# Patient Record
Sex: FEMALE | Race: WHITE | Employment: FULL TIME
[De-identification: names, ages, dates, MRNs, and addresses within clinical notes are randomized per-mention and may not be internally consistent; named-entity substitution may affect disease eponyms.]

---

## 2018-08-05 ENCOUNTER — HEALTH MAINTENANCE LETTER (OUTPATIENT)
Age: 38
End: 2018-08-05

## 2019-02-07 ENCOUNTER — OFFICE VISIT - RIVER FALLS (OUTPATIENT)
Dept: FAMILY MEDICINE | Facility: CLINIC | Age: 39
End: 2019-02-07

## 2019-02-07 ASSESSMENT — MIFFLIN-ST. JEOR: SCORE: 1495.26

## 2019-03-05 ENCOUNTER — AMBULATORY - RIVER FALLS (OUTPATIENT)
Dept: FAMILY MEDICINE | Facility: CLINIC | Age: 39
End: 2019-03-05

## 2019-03-06 LAB
T3FREE SERPL-MCNC: 2.7 PG/ML (ref 2.3–4.2)
T4 FREE SERPL-MCNC: 1.1 NG/DL (ref 0.8–1.8)
THYROGLOBULIN ABY - HISTORICAL: <1 IU/ML
THYROID PEROXIDASE ANTIBODIES - HISTORICAL: 47 IU/ML
TSH SERPL DL<=0.005 MIU/L-ACNC: 2.9 MIU/L
VIT B12 SERPL-MCNC: 418 PG/ML (ref 200–1100)

## 2019-03-15 ENCOUNTER — OFFICE VISIT - RIVER FALLS (OUTPATIENT)
Dept: FAMILY MEDICINE | Facility: CLINIC | Age: 39
End: 2019-03-15

## 2019-06-10 ENCOUNTER — OFFICE VISIT - RIVER FALLS (OUTPATIENT)
Dept: FAMILY MEDICINE | Facility: CLINIC | Age: 39
End: 2019-06-10

## 2019-06-10 ASSESSMENT — MIFFLIN-ST. JEOR: SCORE: 1453.53

## 2019-06-24 ENCOUNTER — COMMUNICATION - RIVER FALLS (OUTPATIENT)
Dept: FAMILY MEDICINE | Facility: CLINIC | Age: 39
End: 2019-06-24

## 2019-06-27 ENCOUNTER — COMMUNICATION - RIVER FALLS (OUTPATIENT)
Dept: FAMILY MEDICINE | Facility: CLINIC | Age: 39
End: 2019-06-27

## 2019-07-01 ENCOUNTER — OFFICE VISIT - RIVER FALLS (OUTPATIENT)
Dept: FAMILY MEDICINE | Facility: CLINIC | Age: 39
End: 2019-07-01

## 2019-07-01 ENCOUNTER — COMMUNICATION - RIVER FALLS (OUTPATIENT)
Dept: FAMILY MEDICINE | Facility: CLINIC | Age: 39
End: 2019-07-01

## 2019-07-01 LAB
HGB BLD-MCNC: 10.3 G/DL (ref 12–16)
MCV RBC AUTO: 80 FL (ref 80–100)

## 2019-07-01 ASSESSMENT — MIFFLIN-ST. JEOR: SCORE: 1449

## 2019-07-09 ENCOUNTER — COMMUNICATION - RIVER FALLS (OUTPATIENT)
Dept: FAMILY MEDICINE | Facility: CLINIC | Age: 39
End: 2019-07-09

## 2019-07-22 ENCOUNTER — AMBULATORY - RIVER FALLS (OUTPATIENT)
Dept: FAMILY MEDICINE | Facility: CLINIC | Age: 39
End: 2019-07-22

## 2019-07-22 ENCOUNTER — COMMUNICATION - RIVER FALLS (OUTPATIENT)
Dept: FAMILY MEDICINE | Facility: CLINIC | Age: 39
End: 2019-07-22

## 2019-07-22 LAB — HGB BLD-MCNC: 11.6 G/DL

## 2019-07-31 ENCOUNTER — COMMUNICATION - RIVER FALLS (OUTPATIENT)
Dept: FAMILY MEDICINE | Facility: CLINIC | Age: 39
End: 2019-07-31

## 2019-07-31 ENCOUNTER — OFFICE VISIT - RIVER FALLS (OUTPATIENT)
Dept: FAMILY MEDICINE | Facility: CLINIC | Age: 39
End: 2019-07-31

## 2019-07-31 ASSESSMENT — MIFFLIN-ST. JEOR: SCORE: 1439.92

## 2019-08-01 ENCOUNTER — OFFICE VISIT - RIVER FALLS (OUTPATIENT)
Dept: FAMILY MEDICINE | Facility: CLINIC | Age: 39
End: 2019-08-01

## 2019-08-01 LAB
B BURGDOR IGG+IGM SER QL: <0.9
C REACTIVE PROTEIN LHE: 4.4 MG/L
ERYTHROCYTE [DISTWIDTH] IN BLOOD BY AUTOMATED COUNT: 20.9 % (ref 11–15)
ERYTHROCYTE [SEDIMENTATION RATE] IN BLOOD BY WESTERGREN METHOD: 2 MM/H
FERRITIN SERPL-MCNC: 106 NG/ML (ref 16–154)
HCT VFR BLD AUTO: 38.5 % (ref 35–45)
HGB BLD-MCNC: 12.4 GM/DL (ref 11.7–15.5)
IRON SATURATION: 23 (ref 16–45)
IRON SERPL-MCNC: 76 MCG/DL (ref 40–190)
MCH RBC QN AUTO: 27.4 PG (ref 27–33)
MCHC RBC AUTO-ENTMCNC: 32.2 GM/DL (ref 32–36)
MCV RBC AUTO: 85.2 FL (ref 80–100)
PLATELET # BLD AUTO: 231 10*3/UL (ref 140–400)
PMV BLD: 9.1 FL (ref 7.5–12.5)
RBC # BLD AUTO: 4.52 10*6/UL (ref 3.8–5.1)
TIBC - QUEST: 326 (ref 250–450)
TRANSFERRIN: 247 MG/DL (ref 188–341)
TSH SERPL DL<=0.005 MIU/L-ACNC: 0.92 MIU/L
WBC # BLD AUTO: 5 10*3/UL (ref 3.8–10.8)

## 2019-08-01 ASSESSMENT — MIFFLIN-ST. JEOR: SCORE: 1439.92

## 2019-08-12 ENCOUNTER — COMMUNICATION - RIVER FALLS (OUTPATIENT)
Dept: FAMILY MEDICINE | Facility: CLINIC | Age: 39
End: 2019-08-12

## 2019-10-22 ENCOUNTER — COMMUNICATION - RIVER FALLS (OUTPATIENT)
Dept: FAMILY MEDICINE | Facility: CLINIC | Age: 39
End: 2019-10-22

## 2019-10-29 ENCOUNTER — COMMUNICATION - RIVER FALLS (OUTPATIENT)
Dept: FAMILY MEDICINE | Facility: CLINIC | Age: 39
End: 2019-10-29

## 2019-11-03 ENCOUNTER — HEALTH MAINTENANCE LETTER (OUTPATIENT)
Age: 39
End: 2019-11-03

## 2020-02-10 ENCOUNTER — OFFICE VISIT - RIVER FALLS (OUTPATIENT)
Dept: FAMILY MEDICINE | Facility: CLINIC | Age: 40
End: 2020-02-10

## 2020-02-10 ASSESSMENT — MIFFLIN-ST. JEOR: SCORE: 1468.05

## 2020-02-17 ENCOUNTER — COMMUNICATION - RIVER FALLS (OUTPATIENT)
Dept: FAMILY MEDICINE | Facility: CLINIC | Age: 40
End: 2020-02-17

## 2020-02-24 ENCOUNTER — COMMUNICATION - RIVER FALLS (OUTPATIENT)
Dept: FAMILY MEDICINE | Facility: CLINIC | Age: 40
End: 2020-02-24

## 2020-03-30 ENCOUNTER — OFFICE VISIT - RIVER FALLS (OUTPATIENT)
Dept: FAMILY MEDICINE | Facility: CLINIC | Age: 40
End: 2020-03-30

## 2020-03-30 ENCOUNTER — COMMUNICATION - RIVER FALLS (OUTPATIENT)
Dept: FAMILY MEDICINE | Facility: CLINIC | Age: 40
End: 2020-03-30

## 2020-03-31 ENCOUNTER — AMBULATORY - RIVER FALLS (OUTPATIENT)
Dept: FAMILY MEDICINE | Facility: CLINIC | Age: 40
End: 2020-03-31

## 2020-04-01 ENCOUNTER — COMMUNICATION - RIVER FALLS (OUTPATIENT)
Dept: FAMILY MEDICINE | Facility: CLINIC | Age: 40
End: 2020-04-01

## 2020-04-01 LAB
ERYTHROCYTE [DISTWIDTH] IN BLOOD BY AUTOMATED COUNT: 12.6 % (ref 11–15)
HCT VFR BLD AUTO: 38.4 % (ref 35–45)
HGB BLD-MCNC: 13.3 GM/DL (ref 11.7–15.5)
MCH RBC QN AUTO: 32 PG (ref 27–33)
MCHC RBC AUTO-ENTMCNC: 34.6 GM/DL (ref 32–36)
MCV RBC AUTO: 92.3 FL (ref 80–100)
PLATELET # BLD AUTO: 200 10*3/UL (ref 140–400)
PMV BLD: 10 FL (ref 7.5–12.5)
RBC # BLD AUTO: 4.16 10*6/UL (ref 3.8–5.1)
TSH SERPL DL<=0.005 MIU/L-ACNC: 1.33 MIU/L
WBC # BLD AUTO: 5.6 10*3/UL (ref 3.8–10.8)

## 2020-06-17 ENCOUNTER — COMMUNICATION - RIVER FALLS (OUTPATIENT)
Dept: FAMILY MEDICINE | Facility: CLINIC | Age: 40
End: 2020-06-17

## 2020-06-17 ENCOUNTER — OFFICE VISIT - RIVER FALLS (OUTPATIENT)
Dept: FAMILY MEDICINE | Facility: CLINIC | Age: 40
End: 2020-06-17

## 2020-06-18 ENCOUNTER — AMBULATORY - RIVER FALLS (OUTPATIENT)
Dept: FAMILY MEDICINE | Facility: CLINIC | Age: 40
End: 2020-06-18

## 2020-06-30 ENCOUNTER — COMMUNICATION - RIVER FALLS (OUTPATIENT)
Dept: FAMILY MEDICINE | Facility: CLINIC | Age: 40
End: 2020-06-30

## 2020-09-08 ENCOUNTER — COMMUNICATION - RIVER FALLS (OUTPATIENT)
Dept: FAMILY MEDICINE | Facility: CLINIC | Age: 40
End: 2020-09-08

## 2020-09-09 ENCOUNTER — COMMUNICATION - RIVER FALLS (OUTPATIENT)
Dept: FAMILY MEDICINE | Facility: CLINIC | Age: 40
End: 2020-09-09

## 2020-09-14 ENCOUNTER — COMMUNICATION - RIVER FALLS (OUTPATIENT)
Dept: FAMILY MEDICINE | Facility: CLINIC | Age: 40
End: 2020-09-14

## 2020-09-17 ENCOUNTER — COMMUNICATION - RIVER FALLS (OUTPATIENT)
Dept: FAMILY MEDICINE | Facility: CLINIC | Age: 40
End: 2020-09-17

## 2020-09-17 ENCOUNTER — OFFICE VISIT - RIVER FALLS (OUTPATIENT)
Dept: FAMILY MEDICINE | Facility: CLINIC | Age: 40
End: 2020-09-17

## 2020-09-17 LAB
A/G RATIO - HISTORICAL: 1.4 (ref 1–2)
ALBUMIN SERPL-MCNC: 4.2 G/DL (ref 3.5–5.2)
ALP SERPL-CCNC: 53 IU/L (ref 50–136)
ALT SERPL W P-5'-P-CCNC: 12 IU/L (ref 8–45)
ANION GAP SERPL CALCULATED.3IONS-SCNC: 7 MMOL/L (ref 5–18)
AST SERPL W P-5'-P-CCNC: 15 IU/L (ref 2–40)
BILIRUB DIRECT SERPL-MCNC: 0.1 MG/DL (ref 0.1–0.5)
BILIRUB INDIRECT SERPL-MCNC: 0.2 MG/DL (ref 0.2–0.8)
BILIRUB SERPL-MCNC: 0.3 MG/DL (ref 0.2–1.2)
BUN SERPL-MCNC: 13 MG/DL (ref 8–25)
BUN/CREAT RATIO - HISTORICAL: 16 (ref 10–20)
CALCIUM SERPL-MCNC: 8.7 MG/DL (ref 8.5–10.5)
CHLORIDE BLD-SCNC: 106 MMOL/L (ref 98–110)
CO2 SERPL-SCNC: 26 MMOL/L (ref 21–31)
CREAT SERPL-MCNC: 0.8 MG/DL (ref 0.57–1.11)
ERYTHROCYTE [DISTWIDTH] IN BLOOD BY AUTOMATED COUNT: 12.5 % (ref 11.5–15.5)
GFR ESTIMATE EXT - HISTORICAL: >60
GLOBULIN: 3 G/DL (ref 2–3.7)
GLUCOSE BLD-MCNC: 104 MG/DL (ref 65–100)
HCT VFR BLD AUTO: 41.3 % (ref 33–51)
HGB BLD-MCNC: 14.2 G/DL (ref 12–16)
INR PPP: 1
MCH RBC QN AUTO: 31.8 PG (ref 26–34)
MCHC RBC AUTO-ENTMCNC: 34.4 G/DL (ref 32–36)
MCV RBC AUTO: 93 FL (ref 80–100)
PLATELET # BLD AUTO: 232 10*3/UL (ref 140–440)
PMV BLD: 9.1 FL (ref 6.5–11)
POTASSIUM BLD-SCNC: 4 MMOL/L (ref 3.5–5)
PROT SERPL-MCNC: 7.2 G/DL (ref 6–8)
PROTHROMBIN TIME: 12.8 S (ref 12–14.6)
RBC # BLD AUTO: 4.46 10*6/UL (ref 4–5.2)
SODIUM SERPL-SCNC: 139 MMOL/L (ref 135–145)
TROPONIN I - HISTORICAL: <0.01 NG/ML
WBC # BLD AUTO: 6.3 10*3/UL (ref 4.5–11)

## 2020-09-17 ASSESSMENT — MIFFLIN-ST. JEOR: SCORE: 1503.43

## 2020-09-18 ENCOUNTER — COMMUNICATION - RIVER FALLS (OUTPATIENT)
Dept: FAMILY MEDICINE | Facility: CLINIC | Age: 40
End: 2020-09-18

## 2020-09-18 LAB — TSH SERPL DL<=0.005 MIU/L-ACNC: 1.56 MIU/L

## 2020-09-23 ENCOUNTER — COMMUNICATION - RIVER FALLS (OUTPATIENT)
Dept: FAMILY MEDICINE | Facility: CLINIC | Age: 40
End: 2020-09-23

## 2020-10-13 ENCOUNTER — COMMUNICATION - RIVER FALLS (OUTPATIENT)
Dept: FAMILY MEDICINE | Facility: CLINIC | Age: 40
End: 2020-10-13

## 2020-10-14 ENCOUNTER — COMMUNICATION - RIVER FALLS (OUTPATIENT)
Dept: FAMILY MEDICINE | Facility: CLINIC | Age: 40
End: 2020-10-14

## 2020-10-16 ENCOUNTER — COMMUNICATION - RIVER FALLS (OUTPATIENT)
Dept: FAMILY MEDICINE | Facility: CLINIC | Age: 40
End: 2020-10-16

## 2020-11-16 ENCOUNTER — HEALTH MAINTENANCE LETTER (OUTPATIENT)
Age: 40
End: 2020-11-16

## 2020-12-18 ENCOUNTER — COMMUNICATION - RIVER FALLS (OUTPATIENT)
Dept: FAMILY MEDICINE | Facility: CLINIC | Age: 40
End: 2020-12-18

## 2021-08-15 ENCOUNTER — TRANSFERRED RECORDS (OUTPATIENT)
Dept: HEALTH INFORMATION MANAGEMENT | Facility: CLINIC | Age: 41
End: 2021-08-15

## 2021-09-18 ENCOUNTER — HEALTH MAINTENANCE LETTER (OUTPATIENT)
Age: 41
End: 2021-09-18

## 2022-01-08 ENCOUNTER — HEALTH MAINTENANCE LETTER (OUTPATIENT)
Age: 42
End: 2022-01-08

## 2022-02-11 VITALS
SYSTOLIC BLOOD PRESSURE: 108 MMHG | BODY MASS INDEX: 27.32 KG/M2 | HEIGHT: 66 IN | DIASTOLIC BLOOD PRESSURE: 60 MMHG | DIASTOLIC BLOOD PRESSURE: 72 MMHG | WEIGHT: 173 LBS | WEIGHT: 170 LBS | HEART RATE: 60 BPM | BODY MASS INDEX: 27.32 KG/M2 | DIASTOLIC BLOOD PRESSURE: 60 MMHG | BODY MASS INDEX: 27.64 KG/M2 | SYSTOLIC BLOOD PRESSURE: 126 MMHG | SYSTOLIC BLOOD PRESSURE: 110 MMHG | HEIGHT: 66 IN | TEMPERATURE: 97.5 F | HEART RATE: 60 BPM | OXYGEN SATURATION: 98 % | DIASTOLIC BLOOD PRESSURE: 76 MMHG | HEIGHT: 66 IN | HEART RATE: 70 BPM | HEIGHT: 66 IN | BODY MASS INDEX: 27.8 KG/M2 | SYSTOLIC BLOOD PRESSURE: 124 MMHG | WEIGHT: 172 LBS | HEART RATE: 63 BPM | HEART RATE: 68 BPM | TEMPERATURE: 97.8 F | DIASTOLIC BLOOD PRESSURE: 66 MMHG | SYSTOLIC BLOOD PRESSURE: 114 MMHG | WEIGHT: 170 LBS

## 2022-02-11 VITALS
DIASTOLIC BLOOD PRESSURE: 82 MMHG | TEMPERATURE: 97.6 F | HEART RATE: 64 BPM | SYSTOLIC BLOOD PRESSURE: 126 MMHG | WEIGHT: 182 LBS | BODY MASS INDEX: 29.83 KG/M2

## 2022-02-11 VITALS
DIASTOLIC BLOOD PRESSURE: 95 MMHG | HEIGHT: 66 IN | DIASTOLIC BLOOD PRESSURE: 92 MMHG | BODY MASS INDEX: 27.86 KG/M2 | HEIGHT: 66 IN | WEIGHT: 184 LBS | OXYGEN SATURATION: 98 % | TEMPERATURE: 98.2 F | BODY MASS INDEX: 29.57 KG/M2 | HEART RATE: 73 BPM | SYSTOLIC BLOOD PRESSURE: 166 MMHG | HEART RATE: 67 BPM | SYSTOLIC BLOOD PRESSURE: 138 MMHG

## 2022-02-11 VITALS
HEART RATE: 60 BPM | HEIGHT: 66 IN | BODY MASS INDEX: 28.32 KG/M2 | DIASTOLIC BLOOD PRESSURE: 60 MMHG | WEIGHT: 176.2 LBS | OXYGEN SATURATION: 96 % | SYSTOLIC BLOOD PRESSURE: 102 MMHG

## 2022-02-11 VITALS
HEART RATE: 68 BPM | BODY MASS INDEX: 29.28 KG/M2 | TEMPERATURE: 98.7 F | DIASTOLIC BLOOD PRESSURE: 62 MMHG | WEIGHT: 182.2 LBS | SYSTOLIC BLOOD PRESSURE: 80 MMHG | HEIGHT: 66 IN

## 2022-02-11 VITALS — HEIGHT: 66 IN

## 2022-02-16 NOTE — TELEPHONE ENCOUNTER
Entered by Marycarmen Richard CMA on March 30, 2020 10:43:00 AM CDT  Returned Call  Time: 10:42 am  Note:  Called & left a message to call back. Will suggest a Telemedicine visit.       ---------------------  From: MAXIMILIAN SWIFT  To: [facility]  Sent: 03/30/2020 07:28 a.m. CDT  Subject: Cold night sweats  Hello,    I ve been having cold night sweats off and on for about six weeks or so.  I m prone to night sweats, but these cold ones have me shivering. I ve tried different room temps but it doesn t seem to matter.    Any ideas why this could be happening?    Thanks,  Marleni Call  Time: 11:20 am  Note:  Pt called back stating that she has had no other symptoms but this and has a few questions. Suggested a Telemedicine visit to discuss with KWL and pt agreed. Scheduled for 2 pm.

## 2022-02-16 NOTE — TELEPHONE ENCOUNTER
"---------------------  From: Gabriella George MA (Phone Messages Pool ([id number]_[facility]))   To: BELKIS SWIFT    Sent: 9/17/2020 10:31:05 AM CDT  Subject: RE: Dr. Jayjay Tamayo,    You can stop by the clinic to have your BP checked at anytime before 4:30.  You don't need an appointment to have your BP checked as we don't charge patients for that service.  If you have any further concerns, please let us know.    Sincerely,      Gabriella HOLLOWAY CMA      ---------------------  From: BELKIS SWIFT  [facility]  Sent: 09/17/2020 09:58 a.m. CDT  Subject: Dr. Jayjay Ro,    I have one of my grandma s blood pressure cuffs.  I ve been feeling \"off\" this week, light headed, and my bp has been high, 136-140/84. Maybe the machine is wrong.  I m normally 112-116/74ish.  Can I stop in and get it checked or do I need to make an appointment?    Belkis"

## 2022-02-16 NOTE — NURSING NOTE
Comprehensive Intake Entered On:  6/17/2020 11:36 AM CDT    Performed On:  6/17/2020 11:32 AM CDT by Hayder Cid CMA   Last Menstrual Period :   6/8/2020 CDT   Menstrual Status :   Menarcheal   Palak GINNY Hayder - 6/17/2020 11:36 AM CDT   Chief Complaint :   Verbal consent given for a video visit.  Pt is c/o nausea,loose stools and lightheadedness that started this morning.   Height Measured :   65.5 in(Converted to: 5 ft 5 in, 166.37 cm)    PalakHayder jurado CMA - 6/17/2020 11:32 AM CDT   Health Status   Allergies Verified? :   Yes   Medication History Verified? :   Yes   Medical History Verified? :   Yes   Pre-Visit Planning Status :   Not completed   Tobacco Use? :   Former smoker   Palak Hayder GROSS - 6/17/2020 11:32 AM CDT   Meds / Allergies   (As Of: 6/17/2020 11:36:07 AM CDT)   Allergies (Active)   No Known Medication Allergies  Estimated Onset Date:   Unspecified ; Created By:   Shantelle Young CMA; Reaction Status:   Active ; Category:   Drug ; Substance:   No Known Medication Allergies ; Type:   Allergy ; Updated By:   Shantelle Young CMA; Reviewed Date:   6/17/2020 11:34 AM CDT        Medication List   (As Of: 6/17/2020 11:36:07 AM CDT)   Prescription/Discharge Order    sertraline  :   sertraline ; Status:   Prescribed ; Ordered As Mnemonic:   Zoloft 100 mg oral tablet ; Simple Display Line:   200 mg, 2 tab(s), Oral, daily, 180 tab(s), 2 Refill(s) ; Ordering Provider:   Michael Ro MD; Catalog Code:   sertraline ; Order Dt/Tm:   2/24/2020 1:11:44 PM CST          desogestrel-ethinyl estradiol  :   desogestrel-ethinyl estradiol ; Status:   Prescribed ; Ordered As Mnemonic:   Kariva oral tablet ; Simple Display Line:   1 tab(s), Oral, daily, skip last week of placebo and start new pack, 84 tab(s), 5 Refill(s) ; Ordering Provider:   Michael Ro MD; Catalog Code:   desogestrel-ethinyl estradiol ; Order Dt/Tm:   12/26/2019 1:11:49 PM CST          esomeprazole  :   esomeprazole ;  Status:   Prescribed ; Ordered As Mnemonic:   NexIUM 40 mg oral delayed release capsule ; Simple Display Line:   40 mg, 1 cap(s), Oral, daily, 90 cap(s), 3 Refill(s) ; Ordering Provider:   Michael Ro MD; Catalog Code:   esomeprazole ; Order Dt/Tm:   10/22/2019 2:52:39 PM CDT          thyroid desiccated  :   thyroid desiccated ; Status:   Prescribed ; Ordered As Mnemonic:   Nature-Throid 97.5 mg oral tablet ; Simple Display Line:   97.5 mg, 1 tab(s), Oral, daily, 90 tab(s), 3 Refill(s) ; Ordering Provider:   Michael Ro MD; Catalog Code:   thyroid desiccated ; Order Dt/Tm:   10/11/2019 10:11:31 AM CDT            Home Meds    buPROPion  :   buPROPion ; Status:   Documented ; Ordered As Mnemonic:   Wellbutrin  mg/24 hours oral tablet, extended release ; Simple Display Line:   300 mg, 1 tab(s), Oral, q 24 hrs, 0 Refill(s) ; Catalog Code:   buPROPion ; Order Dt/Tm:   2/7/2019 2:42:51 PM CST            ID Risk Screen   Recent Travel History :   No recent travel   Family Member Travel History :   No recent travel   Other Exposure to Infectious Disease :   Unknown   Hayder Cid CMA - 6/17/2020 11:32 AM CDT   Social History   Social History   (As Of: 6/17/2020 11:36:07 AM CDT)   Alcohol:  Current      Wine (5 oz), 1-2 times per week   (Last Updated: 2/14/2019 9:43:14 AM CST by Josefina Santos)          Tobacco:  Past      Former smoker, quit more than 30 days ago   Comments:  6/17/2020 11:34 AM - Hayder Cid CMA: quit age 19   (Last Updated: 6/17/2020 11:34:40 AM CDT by Hayder Cid CMA)          Employment/School:        Work/School description: [employer].   (Last Updated: 2/7/2019 3:30:53 PM CST by Michael Ro MD)          Home/Environment:        Marital status: .  Lives with Children, Spouse.  1 children.   (Last Updated: 2/7/2019 3:30:41 PM CST by Jayjay BAÑUELOS, Michael)

## 2022-02-16 NOTE — TELEPHONE ENCOUNTER
---------------------  From: Marycarmen Richard CMA (Phone Messages Pool ([id number]_[facility]))   To: BELKIS SWIFT    Sent: 10/29/2019 8:42:12 AM CDT  Subject: RE: SI, lumbar, left hip pain. Ortho referral?     Dante Tamayo,    Dr. Ro will want to see you in clinic to discuss you symptoms and order the correct imaging to determine what the cause is.   Please call our clinic to set up an apt with her. She is in clinic Tuesday afternoon, Wednesday and Thursday afternoon this week.     [phone]    Jose Manuel WORTHY CMA      ---------------------  From: BELKIS SWIFT  To: [facility]  Sent: 10/28/2019 05:48 p.m. CDT  Subject: SI, lumbar, left hip pain. Ortho referral?  Dante Ro,    Getting to the end of my busy season and my back is telling me it s done. I ve been getting severe sharp pain in my low back and SI occasionally when I step down with my left foot.  Or if I misjudge the ground and my left foot lands lower than my right.  My entire lower back seizes up and the pain is excruciating. Sometimes I think I m going to be stuck.  I thought I was going down coming out of [place] and walking down those two steps.  My left hip has been giving me trouble as well, but maybe it s all related.  Should I make an appointment with you?    Belkis

## 2022-02-16 NOTE — NURSING NOTE
CAGE Assessment Entered On:  2/7/2019 3:26 PM CST    Performed On:  2/7/2019 3:26 PM CST by Shantelle Young CMA               Assessment   Have you ever felt you should cut down on your drinking :   No   Have people annoyed you by criticizing your drinking :   No   Have you ever felt bad or guilty about your drinking :   No   Have you ever taken a drink first thing in the morning to steady your nerves or get rid of a hangover (Eye-opener) :   No   CAGE Score :   0    Shantelle Young CMA - 2/7/2019 3:26 PM CST

## 2022-02-16 NOTE — TELEPHONE ENCOUNTER
---------------------  From: Michael Ro MD   To: Phone Messages Pool ([id number]_[facility]);     Sent: 7/22/2019 4:15:23 PM CDT  Subject: General Message     Please notify patient that her hemoglobin is up to 11.6 (was 10.3 at last check). Thanks---------------------  From: Prisca Leslie MA (Phone Messages Pool ([id number]_[facility]))   To: MAXIMILIAN SWIFT    Sent: 7/22/2019 4:17:46 PM CDT  Subject: FW: General Message     Buster Tamayo,     Your hemoglobin is back up to 11.6, it was previously at 10.3 the last time we checked it.     Let us know if you need anything else.   Evonne

## 2022-02-16 NOTE — TELEPHONE ENCOUNTER
---------------------  From: Magdalena Monterroso LPN (Phone Messages Pool ([id number]))   To: [facility] Message Pool ([id number]);     Sent: 9/8/2020 8:17:57 AM CDT  Subject: CONSUMER MESSAGE FW: Ascites           ---------------------  From: MAXIMILIAN SWIFT  To: [facility]  Sent: 09/07/2020 09:32 p.m. CDT  Subject: Ascites  Dr. Ro is my primary.    I am a little concerned that my ascites is back.  I have been feeling bloated and have difficulty breathing at times, normally when I m laying down, but this morning I got out of breath just walking on our property.  I walk a lot for my job so that was odd.  This has been going on for three to four weeks. I thought maybe I was just getting fatter but I kind of wonder about that now.    If you recall, I spent five days up in [place] end of June 2019.  The fluid had built up so much it was causing a lot of pain.  I m not there yet, but my belly feels huge.    KariPlease Advise---------------------  From: Shantelle Young CMA ([facility] Message Pool ([id number]))   To: Michael Ro MD;     Sent: 9/8/2020 8:20:08 AM CDT  Subject:  CONSUMER MESSAGE FW: AscitesPlease call her. I recommend a visit, ideally in person. If can't wait until tomorrow, can be seen by someone else in clinic. Otherwise, can be double booked with me with one of my physicals tomorrow or Thursday.---------------------  From: Michael Ro MD   To: [facility] Message Pool ([id number]);     Sent: 9/8/2020 9:21:45 AM CDT  Subject: RE: CONSUMER MESSAGE FW: Ascites---------------------  From: Shantelle Young CMA ([facility] Message Pool ([id number]_[facility]))   To: Phone Messages Pool (32224_[facility]);     Sent: 9/8/2020 9:27:10 AM CDT  Subject: FW: CONSUMER MESSAGE FW: AscitesCalled and LM letting patient know.Pt LVM at 0903 asking for call back to [phone]  Called pt at [phone]  LVM with info from [facility] to make appt today or tomorrow in person.Pt LM at  10:47am.  Returned call at 11:08am informing her of message per K[facility]. Pt would like appt around 1pm tomorrow.  Pt put on [facility] schedule

## 2022-02-16 NOTE — PROGRESS NOTES
Patient:   MAXIMILIAN SWIFT            MRN: [id number]            FIN: [id number]               Age:   40 years     Sex:  Female     :  1980   Associated Diagnoses:   Lightheadedness; Nausea   Author:   Ajith PONCE, Dylon ALCANTARA      Visit Information      Date of Service: 2020 10:52 am  Performing Location: [facility]  Encounter#: [id number]      Primary Care Provider (PCP):  Michael Ro MD    NPI# [id number]   Visit type:  Video Visit via [employer] .    Participants in room during visit:  _1   Location of patient:  _home  Location of provider:  _  Home office)  Video Start Time:  _1145  Video End Time:   _1153    Today's visit was conducted via video conference due to the COVID-19 pandemic.  The patient's consent to proceed with a video visit has been obtained and documented.      Chief Complaint   2020 11:32 AM CDT   Verbal consent given for a video visit.  Pt is c/o nausea,loose stools and lightheadedness that started this morning.        History of Present Illness   Patient is a _40 year old _female who is being evaluated via a billable video visit.  Chief complaint and symptoms noted above and confirmed with patient   she normally has some heat intolerance issues, but this morning was not hot or humid, she is a farrier  after her first visit she felt nausea, diarrhea, lightheadedness  no new foods, no fevers,  no treatments  no  sxs, is well hydrated           Review of Systems   Constitutional:  Negative.    Ear/Nose/Mouth/Throat:  Negative.    Respiratory:  Negative.    Cardiovascular:  Negative.    Gastrointestinal:  Nausea, Diarrhea, No vomiting.    Genitourinary:  Negative.    Gynecologic:  Negative.    Neurologic:  Negative, lightheadness.    Psychiatric:  Negative.       Health Status   Allergies:    Allergic Reactions (Selected)  No Known Medication Allergies   Medications:  (Selected)   Prescriptions  Prescribed  Kariva oral tablet: 1 tab(s), Oral, daily, Instructions: skip  last week of placebo and start new pack, # 84 tab(s), 5 Refill(s), Type: Maintenance, Pharmacy: [facility], 1 tab(s) Oral daily,Instr:skip last week of placebo and start new pack  Nature-Throid 97.5 mg oral tablet: = 1 tab(s) ( 97.5 mg ), Oral, daily, # 90 tab(s), 3 Refill(s), Type: Maintenance, Pharmacy: [facility], 1 tab(s) Oral daily  NexIUM 40 mg oral delayed release capsule: = 1 cap(s) ( 40 mg ), Oral, daily, # 90 cap(s), 3 Refill(s), Type: Maintenance, Pharmacy: [facility], 1 cap(s) Oral daily  Zoloft 100 mg oral tablet: = 2 tab(s) ( 200 mg ), Oral, daily, # 180 tab(s), 2 Refill(s), Type: Maintenance, Pharmacy: [facility], 2 tab(s) Oral daily  Documented Medications  Documented  Wellbutrin  mg/24 hours oral tablet, extended release: = 1 tab(s) ( 300 mg ), Oral, q 24 hrs, 0 Refill(s), Type: Maintenance   Problem list:    All Problems  Chronic back pain / SNOMED CT 683378263 / Confirmed  Peptic ulcer disease / SNOMED CT 71691332 / Confirmed  Moderate major depression / SNOMED CT 6292697 / Confirmed  Hypothyroidism / SNOMED CT 381840827 / Confirmed      Histories   Past Medical History:    Active  Moderate major depression (7119013)  Peptic ulcer disease (83034979)  Chronic back pain (747243142)  Resolved  Inpatient stay (857868167): Onset on 2019 at 39 years.  Resolved on 2019 at 39 years.  Comments:  7/10/2019 CDT 11:36 AM CDT - Josefina Santos [facility] [place] - Epigastric abdominal pain.  Disseminated intravascular coagulation (418470363):  Resolved.  Comments:  2019 CST 3:29 PM CST - Michael Ro MD  following   Pregnancy (499113080):  Resolved on 2016 at 36 years.   Family History:    BCC - Basal cell carcinoma  Sister  Hypertension  Father  Liver damage  Grandfather (M)  [place] - Cancer  Father  Comments:  2019 9:42 AM JASMIN Santos ,  Josefina  Esophageal  Alcoholism  Father  Grandfather (M)  Depression  Father     Procedure history:     section (49862258) on 2016 at 36 Years.  Laparotomy (502277170) on 2016 at 36 Years.  Comments:  2019 3:28 PM CST - Jayjay BAÑUELOS, Michael  done for postpartum hemorrhage following    Social History:        Alcohol Assessment: Current            Wine (5 oz), 1-2 times per week      Tobacco Assessment: Past            Former smoker, quit more than 30 days ago                     Comments:                      2020 - Hayder Cid CMA                     quit age 19      Employment and Education Assessment            Work/School description: [employer].      Home and Environment Assessment            Marital status: .  Lives with Children, Spouse.  1 children.        Physical Examination   Last Menstrual Period: 2020     Measurements from flowsheet : Measurements   2020 11:32 AM CDT   Height Measured - Standard                65.5 in     General:  No acute distress.    Respiratory:  Respirations are non-labored.    Psychiatric:  Cooperative, Appropriate mood & affect, Normal judgment.       Impression and Plan   Diagnosis     Lightheadedness ([id number] R42).     Nausea (WDY95-UO R11.0).     Summary:  will treat current sxs with zofran, advised to stay well hydrated  because of the sudden onset of these sxs without an obvious cause, will test for Covid-19, advised to self-isolate for 7 days  follow up if sxs become worse, Patient is referred for curbside COVID-19 testing and is instructed of the following:  Patient should remain isolated until results of test return and given that tests are not 100% accurate, would be safest to assume that they are contagious with COVID-19 until their symptoms have fully resolved. Isolation is recommended for at least 7 days from the onset of symptoms and for 3 days after resolution of fevers and productive cough. This means patient should  not go to work or any public areas. In addition, it is recommended at home that they separate themselves from other people and from animals as much as possible, including using a separate bathroom. If they do need to be around others, a facemask is recommended. Frequent hand hygiene and cleaning of high touch surfaces is also recommended.   Symptoms can last for several weeks. For patients with COVID-19, they can sometimes start to improve and then get worse again. If symptoms worsen at any time, including significant shortness of breath, low oxygen levels, high fevers that cannot be controlled, or concerns for dehydration, they should seek medical care. If going to the ER, calling 911, or seeking care at the clinic, they are reminded to notify staff that they have been tested for COVID-19.  Patient also is informed that testing will be done in their car at a scheduled time. Test will be sent to an outside commercial lab and billed by that lab. [facility] cannot confirm to patient how billing will be handled by their insurance company.    Patient is also informed that testing for COVID-19 must be reported to the public health department along with contact information for the patient.   Patient information is given to scheduling staff to get patient scheduled for testing. Patient will receive further instructions from scheduling staff.  Patient is encouraged to call back at any time with questions or concerns.  .    Orders     Orders   Pharmacy:  Zofran ODT 8 mg oral tablet, disintegrating (Prescribe): = 1 tab(s) ( 8 mg ), po, q8 hrs, PRN: for nausea/vomiting, # 12 tab(s), 1 Refill(s), Type: Maintenance, Pharmacy: [facility] , 1 tab(s) Oral q8 hrs,PRN:for nausea/vomiting, 65.5, in, 6/17/2020 11:32 AM CDT, Height Measured, 176.2, lb, 2/10/2020 9:24 AM CST, Weight Measured.     Orders   Lab (Gen Lab  Reference Lab):  SARS-CoV-2 RNA (COVID-19), Qualitative NAAT* ([facility]) (Order): Specimen  Type: Nasopharyngeal Swab, Collection Date: 6/17/2020 11:57 AM CDT.     Orders   Charges (Evaluation and Management):  51150 office outpatient visit 15 minutes (Charge) (Order): Quantity: 1, Lightheadedness  Nausea.

## 2022-02-16 NOTE — TELEPHONE ENCOUNTER
---------------------  From: Magdalena Monterroso LPN (Phone Messages Pool ([id number]))   To: Advanced Practice Provider [place] ([address] [place] [address]);     Sent: 12/18/2020 2:25:58 PM CST  Subject: CONSUMER MESSAGE FW: Nexium     Please advise. It has been over 1 year since last prescribed.  10/22/19 Nexium 40mg 1 cap PO daily #90 with 3 refills        ---------------------  From: BELKIS SWIFT  To: [facility]  Sent: 12/18/2020 02:09 p.m. CST  Subject: Nexium  Hello,  I need a new prescription for Nexium. I ve been using some omeprazole that I had around but it isn t cutting it.  Belkis---------------------  From: Ajith PONCE, Dylon ALCANTARA (Advanced Practice Provider Pool (32224[facility]))   To: Phone Messages Pool ([address] [place] [address]);     Sent: 12/18/2020 2:49:05 PM CST  Subject: RE: CONSUMER MESSAGE FW: Nexium     30 day supply sent to [facility] in [place], should follow up with her primary for further refills---------------------  From: Magdalena Monterroso LPN (Phone Messages Pool (32224[facility]))   To: BELKIS SWIFT    Sent: 12/18/2020 2:49:54 PM CST  Subject: FW: CONSUMER MESSAGE FW: Nexium     Belkis,    A 30 day supply was sent in. Please follow up with Dr. Ro for further refills.    Thanks,  Magdaelna SHETH LPN

## 2022-02-16 NOTE — NURSING NOTE
Comprehensive Intake Entered On:  7/1/2019 1:25 PM CDT    Performed On:  7/1/2019 1:20 PM CDT by Dagmar Childers               Summary   Chief Complaint :   pt here to f/u hospital stay, pt is getting an infusion and ould like hgb checked   Weight Measured :   172 lb(Converted to: 172 lb 0 oz, 78.02 kg)    Height Measured :   65.5 in(Converted to: 5 ft 5 in, 166.37 cm)    Body Mass Index :   28.18 kg/m2 (HI)    Body Surface Area :   1.9 m2   Systolic Blood Pressure :   124 mmHg   Diastolic Blood Pressure :   72 mmHg   Mean Arterial Pressure :   89 mmHg   Peripheral Pulse Rate :   63 bpm   BP Site :   Right arm   Pulse Site :   Radial artery   BP Method :   Manual   HR Method :   Manual   Temperature Tympanic :   97.5 DegF(Converted to: 36.4 DegC)  (LOW)    Dagmar Childers - 7/1/2019 1:20 PM CDT   Health Status   Allergies Verified? :   Yes   Medication History Verified? :   Yes   Medical History Verified? :   Yes   Pre-Visit Planning Status :   Completed   Tobacco Use? :   Former smoker   Dagmar Childers - 7/1/2019 1:20 PM CDT   Consents   Consent for Immunization Exchange :   Consent Granted   Consent for Immunizations to Providers :   Consent Granted   Dagmar Childers - 7/1/2019 1:20 PM CDT   Meds / Allergies   (As Of: 7/1/2019 1:25:29 PM CDT)   Allergies (Active)   No Known Medication Allergies  Estimated Onset Date:   Unspecified ; Created By:   Shantelle Young CMA; Reaction Status:   Active ; Category:   Drug ; Substance:   No Known Medication Allergies ; Type:   Allergy ; Updated By:   Shantelle Young CMA; Reviewed Date:   7/1/2019 1:22 PM CDT        Medication List   (As Of: 7/1/2019 1:25:29 PM CDT)   Prescription/Discharge Order    thyroid desiccated  :   thyroid desiccated ; Status:   Prescribed ; Ordered As Mnemonic:   Nature-Throid 97.5 mg oral tablet ; Simple Display Line:   97.5 mg, 1 tab(s), Oral, daily, 90 tab(s), 3 Refill(s) ; Ordering Provider:   Michael Ro MD; Catalog Code:   thyroid  desiccated ; Order Dt/Tm:   7/1/2019 7:58:11 AM            Home Meds    thyroid desiccated  :   thyroid desiccated ; Status:   Discontinued ; Ordered As Mnemonic:   Nature-Throid 97.5 mg oral tablet ; Simple Display Line:   97.5 mg, 1 tab(s), Oral, daily, 0 Refill(s) ; Ordering Provider:   Michael Ro MD; Catalog Code:   thyroid desiccated ; Order Dt/Tm:   6/10/2019 10:24:02 AM          buPROPion  :   buPROPion ; Status:   Documented ; Ordered As Mnemonic:   Wellbutrin  mg/24 hours oral tablet, extended release ; Simple Display Line:   300 mg, 1 tab(s), Oral, q 24 hrs, 0 Refill(s) ; Catalog Code:   buPROPion ; Order Dt/Tm:   2/7/2019 2:42:51 PM          desogestrel-ethinyl estradiol  :   desogestrel-ethinyl estradiol ; Status:   Documented ; Ordered As Mnemonic:   Kariva oral tablet ; Simple Display Line:   1 tab(s), Oral, daily, 0 Refill(s) ; Catalog Code:   desogestrel-ethinyl estradiol ; Order Dt/Tm:   2/7/2019 2:43:10 PM          esomeprazole  :   esomeprazole ; Status:   Documented ; Ordered As Mnemonic:   NexIUM ; Simple Display Line:   40 mg, Oral, daily, 0 Refill(s) ; Catalog Code:   esomeprazole ; Order Dt/Tm:   2/7/2019 2:42:39 PM          sertraline  :   sertraline ; Status:   Documented ; Ordered As Mnemonic:   Zoloft 100 mg oral tablet ; Simple Display Line:   200 mg, 2 tab(s), Oral, daily, 0 Refill(s) ; Catalog Code:   sertraline ; Order Dt/Tm:   2/7/2019 2:42:28 PM

## 2022-02-16 NOTE — PROGRESS NOTES
Patient:   MAXIMILIAN SWIFT            MRN: [id number]            FIN: [id number]               Age:   40 years     Sex:  Female     :  1980   Associated Diagnoses:   Hypothyroidism; Night sweats; Thyroid antibody positive   Author:   Michael Ro MD      Visit Information   Visit type:  Telephone Encounter.    Source of history:  Patient.    Location of patient:  home  Call Start Time:   1309  Call End Time:   1317      Chief Complaint   night sweats      History of Present Illness   Today's visit was conducted via telephone due to the COVID-19 pandemic. Patient's consent to telephone visit was obtained and documented.      Reason for visit:  patient has been noticing cold night sweats for past 6 weeks  no other symptoms of illness such as cough, congestion, fevers  discussed possible side effects of medications  has been trying different temperatures in the room   history of anemia and hypothyroidism, last labs done 2019 reviewed with patient      Impression and Plan   Diagnosis     Hypothyroidism (CLD52-OH E03.4).     Night sweats (LHZ55-CY R61).     Thyroid antibody positive (RUR50-FV R76.8).     Course:  Worsening.    Plan:  will  have patient come in for labs, no symptoms of infection noted, no unusual weight loss, no other concerns at this time. Will contact patient once labs are back. Did discuss side effects of medication as possible cause. Will consider if continues to be an issue..       Health Status   Allergies:    Allergic Reactions (Selected)  No Known Medication Allergies   Medications:  (Selected)   Prescriptions  Prescribed  Kariva oral tablet: 1 tab(s), Oral, daily, Instructions: skip last week of placebo and start new pack, # 84 tab(s), 5 Refill(s), Type: Maintenance, Pharmacy: [facility], 1 tab(s) Oral daily,Instr:skip last week of placebo and start new pack  Nature-Throid 97.5 mg oral tablet: = 1 tab(s) ( 97.5 mg ), Oral, daily, # 90 tab(s), 3 Refill(s), Type:  Maintenance, Pharmacy: [facility], 1 tab(s) Oral daily  NexIUM 40 mg oral delayed release capsule: = 1 cap(s) ( 40 mg ), Oral, daily, # 90 cap(s), 3 Refill(s), Type: Maintenance, Pharmacy: [facility], 1 cap(s) Oral daily  Zoloft 100 mg oral tablet: = 2 tab(s) ( 200 mg ), Oral, daily, # 180 tab(s), 2 Refill(s), Type: Maintenance, Pharmacy: [facility], 2 tab(s) Oral daily  Documented Medications  Documented  Wellbutrin  mg/24 hours oral tablet, extended release: = 1 tab(s) ( 300 mg ), Oral, q 24 hrs, 0 Refill(s), Type: Maintenance   Problem list:    All Problems  Peptic ulcer disease / SNOMED CT 16997661 / Confirmed  Moderate major depression / SNOMED CT 7426661 / Confirmed  Chronic back pain / SNOMED CT 950569862 / Confirmed      Histories   Social History:        Alcohol Assessment: Current            Wine (5 oz), 1-2 times per week      Tobacco Assessment: Past      Employment and Education Assessment            Work/School description: [employer].      Home and Environment Assessment            Marital status: .  Lives with Children, Spouse.  1 children.

## 2022-02-16 NOTE — TELEPHONE ENCOUNTER
Entered by Leonie Rivera CMA on November 20, 2020 11:18:38 AM CST  ---------------------  From: Leonie Rivera CMA   To: [facility]    Sent: 11/20/2020 11:18:38 AM CST  Subject: Medication Management     ** Submitted: **  Order:sertraline (sertraline 100 mg oral tablet)  2 tab(s)  Oral  daily  Qty:  60 tab(s)        Refills:  0          Substitutions Allowed     Route To Pharmacy - [facility]    Signed by Leonie Rivera CMA  11/20/2020 5:18:00 PM [facility]    ** Submitted: **  Complete:sertraline (Zoloft 100 mg oral tablet)   Signed by Leonie Rivera CMA  11/20/2020 5:18:00 PM [facility]    ** Not Approved:  **  sertraline (SERTRALINE HCL TABS 100MG)  TAKE 2 TABLETS DAILY  Qty:  180 tab(s)        Days Supply:  0        Refills:  3          Substitutions Allowed     Route To Pharmacy - [facility]   Signed by Leonie Rivera CMA            ------------------------------------------  From: [facility]  To: Michael Ro MD  Sent: November 20, 2020 1:17:23 AM CST  Subject: Medication Management  Due: November 7, 2020 12:21:51 AM CST     ** On Hold Pending Signature **     Dispensed Drug: sertraline (sertraline 100 mg oral tablet), TAKE 2 TABLETS DAILY  Quantity: 180 tab(s)  Days Supply: 0  Refills: 3  Substitutions Allowed  Notes from Pharmacy:  ------------------------------------------10/26/20 reminder letter sent  9/17/20 palpitations  RTC due

## 2022-02-16 NOTE — TELEPHONE ENCOUNTER
---------------------  From: Elisabeth Frye CMA   Sent: 10/11/2019 10:14:54 AM CDT  Subject: Pharmacy change     Phone Message    PCP:    GALEN      Time of Call:  9:50am       Person Calling:  Belkis  Phone number:  [phone]    Returned call at: 10:13am- LM on identified voicemail    Note:  Patient called stating she needs her Nature Thyroid medication sent to [facility]. Resent rx to requested pharmacy. Called and notified patient.     Last office visit and reason:  8/1/19 f/u labs

## 2022-02-16 NOTE — LETTER
(Inserted Image. Unable to display) July 02, 2019Re: MAXIMILIAN SWIFTDOB:  1980Jose Manuel Crowley M.D.[address] [place] [address] [address] [place] [address]Sw: Dr. Soas following patient has been referred to your office/practice:   MAXIMILIAN SWIFT Appointment : 07/08/2019Location : [place] GastroPlease refer to the attached clinical documentation for a summary of MAXIMILIAN's care.  Please do not hesitate to contact our office if any additional clinical questions arise. All relevant records and transition of care documents should be mailed or faxed.Your assistance in providing continuity of care is appreciatedSincerely, [facility] & [address] [place] [address](P) [phone](F) [phone]

## 2022-02-16 NOTE — TELEPHONE ENCOUNTER
---------------------  From: Magdalena Monterroso LPN (Phone Messages Pool ([id number]_[place] [facility] [place]))   To: [employer] Message Pool ([id number]_[facility]);     Sent: 10/14/2020 9:10:09 AM CDT  Subject: CONSUMER MESSAGE FW: ECG           ---------------------  From: MAXIMILIAN SWIFT  To: [facility]  Sent: 10/14/2020 08:21 a.m. CDT  Subject: ECG  The one from yesterday that was all over the place is normal?duplicate msg

## 2022-02-16 NOTE — TELEPHONE ENCOUNTER
---------------------  From: Shantelle Young CMA (Phone Messages Pool ([id number]_[facility]))   To: Michael Ro MD;     Sent: 3/1/2019 2:24:53 PM CST  Subject: CONSUEMR MESSAGE     ---------------------  From: MAXIMILIAN SWIFT on behalf of JERICHO SWIFT  To: [facility]  Sent: 03/01/2019 01:50 p.m. CST  Subject: Referral  Dante Nettles,    I can only seem to access [employer] [facility] portal so I m sending the message from there.    I went to an Osteopath in [place], Doc Ignacio D.O., today who might be able to help me with my back issues.  He wrote up a lab request for some bloodwork at [facility] but I need a referral from [facility] in order for them to do it.  Is that something you can help me with?    I paid out of pocket for my visit today ($167) but since I think he might be able to help I m going to contact [facility] and see if it s something they would cover if given a referral.    Thank you,  Dylan,  Yes, I can help with ordering the labs - will just need to get the list from you and then I can look through them and get them ordered.   Glad to hear you are finding some relief.  Thanks,  Fiordaliza Ro---------------------  From: Michael Ro MD   To: Phone Messages Pool ([id number]_[facility]);     Sent: 3/3/2019 11:42:21 AM CST  Subject: RE: CONSUEMR MESSAGE     Please call patient on Monday - no portal. Thanks.Pt was called and notified.

## 2022-02-16 NOTE — TELEPHONE ENCOUNTER
---------------------  From: Bonnie Sam   To: Sabine Raygoza ([id number]_[facility]);     Sent: 7/1/2019 2:33:55 PM CDT  Subject: 7/8/19 @ 1:30pm Dr. Crowley      Gastroenterology; Fax [phone]

## 2022-02-16 NOTE — TELEPHONE ENCOUNTER
---------------------  From: Ernesto Cid CMAle ([facility] Message Pool ([id number]_[facility]))   Sent: 6/17/2020 2:05:30 PM CDT  Subject: COVID testing     Pt was seen today by [facility] for video visit for loose stools and nausea per [facility] pt is to be tested for COVID today.  Pt is still not on curbside testing schedule.  I called and left message for pt to call clinic TODAY and get on curbside testing schedule from 2-4 today.  [phone]  Hayder Palak Belle was scheduled for curbside testing on 6/18 at 3:50pm. Pt called right before appt and rescheduled until 2:00pm on 6/19. Pt did not show up for curbside testing on 6/19. Informed [facility] and he is fine with canceling order. Will wait to see if patient calls back to reschedule again.

## 2022-02-16 NOTE — PROGRESS NOTES
Patient:   MAXIMILIAN SWIFT            MRN: [id number]            FIN: [id number]               Age:   39 years     Sex:  Female     :  1980   Associated Diagnoses:   Chronic low back pain; Moderate major depression; Peptic ulcer disease   Author:   Michael Ro MD      Chief Complaint   2019 2:41 PM CST     New patient; Establish care      History of Present Illness   patient is a 39 year old switching clinics due to an insurance change here to establish care  past history is reviewed - primary issues are chronic gastritis/PUD well controlled on nexium, depression that started following a complicated delivery, and chronic low back pain for which she was previously referred to PT but had to discontinue due to previously noted insurance issues  up to date on all medications  does need new PT referral      Health Status   Allergies:    Allergic Reactions (All)  No Known Medication Allergies   Medications:  (Selected)   Documented Medications  Documented  Kariva oral tablet: 1 tab(s), Oral, daily, 0 Refill(s), Type: Maintenance  NexIUM: ( 40 mg ), Oral, daily, 0 Refill(s), Type: Maintenance  Wellbutrin  mg/24 hours oral tablet, extended release: = 1 tab(s) ( 300 mg ), Oral, q 24 hrs, 0 Refill(s), Type: Maintenance  Zoloft 100 mg oral tablet: = 2 tab(s) ( 200 mg ), Oral, daily, 0 Refill(s), Type: Maintenance   Problem list:    All Problems (Selected)  Peptic ulcer disease / SNOMED CT 47869019 / Confirmed  Moderate major depression / SNOMED CT 8701627 / Confirmed  Chronic back pain / SNOMED CT 887353810 / Confirmed      Histories   Past Medical History:    Resolved  Disseminated intravascular coagulation (SNOMED CT 481528785):  Resolved.  Comments:  2019 CST 3:29 PM CST - Michael Ro MD  following    Family History:    No family history items have been selected or recorded.   Procedure history:     section (96395279).  Laparotomy (620209062).  Comments:  2019 3:28 PM CST -  Jayjay BAÑUELOS, Michael  done for postpartum hemorrhage following    Social History:        Employment and Education Assessment            Work/School description: [employer].      Home and Environment Assessment            Marital status: .  Lives with Children, Spouse.  1 children.      Physical Examination   Vital Signs   2019 2:41 PM CST Temperature Tympanic 98.7 DegF    Peripheral Pulse Rate 68 bpm    Pulse Site Brachial artery    HR Method Manual    Systolic Blood Pressure 80 mmHg  LOW    Diastolic Blood Pressure 62 mmHg    Mean Arterial Pressure 68 mmHg      Measurements from flowsheet : Measurements   2019 2:41 PM CST Height Measured - Standard 65.5 in    Weight Measured - Standard 182.2 lb    BSA 1.95 m2    Body Mass Index 29.86 kg/m2  HI      General:  Alert and oriented, No acute distress.    Psychiatric:  Cooperative, Appropriate mood & affect.       Impression and Plan   Diagnosis     Chronic low back pain (NHJ99-VD M54.5).     Moderate major depression (PKS64-PP F32.1).     Peptic ulcer disease (OLV19-UY K27.9).     Plan:  generally well controlled, will refer on for PT, no refills at this time, follow up as needed, past records requested.

## 2022-02-16 NOTE — TELEPHONE ENCOUNTER
---------------------  From: Prisca Leslie MA (Phone Messages Pool [id number])   To: Michael Ro MD;     Sent: 7/31/2019 9:06:04 AM CDT  Subject: CONSUMER MESSAGE: Dr. Ro           ---------------------  From: MAXIMILIAN SWIFT  To: [facility]  Sent: 07/30/2019 09:28 p.m. CDT  Subject: Dr. Ro  Hi Dr. Ro,  Today was a pretty rough day.  Pressure in my head, dizziness if I moved too quickly.  I came home early from work.  In the last two weeks I ve had one day where I felt pretty good, otherwise I am beyond tired.  I m falling asleep while driving. I actually nodded off Friday on my way home from [place], [place]. I am really struggling with this fatigue.    I am scheduled for the [facility] on Monday.  What is the next step?  I think internal med said something about a hematologist. Should my iron levels be checked again?  Is there anything I can do about this exhaustion?  Once I get working I m better with the increased heart rate, but when I m not working it s pretty tough.  Today I was only feeling worse while working.    Thanks for your thoughts,  Dylan,  Before we send you off to hematology, why don't you come back in for labs - complete blood count, iron levels, and recheck your thyroid. I'm also happy to see you if you would like to discuss further.   Fiordaliza Ro---------------------  From: Michael Ro MD   To: Phone mydala ([id number]_[facility]); MAXIMILIAN SWIFT    Sent: 7/31/2019 9:40:12 AM CDT  Subject: RE: CONSUMER MESSAGE: Dr. Ro---------------------  From: Prisca Leslie MA (Phone [employer] Pool ([id number]_[facility]))   To: Michael Ro MD;     Sent: 7/31/2019 10:02:22 AM CDT  Subject: RE: CONSUMER MESSAGE: Dr. Ro     Spoke with patient she is going to get labs and see Lemuel today.noted, discussed with Lemuel

## 2022-02-16 NOTE — NURSING NOTE
Comprehensive Intake Entered On:  9/17/2020 3:39 PM CDT    Performed On:  9/17/2020 3:37 PM CDT by Carly Segura CMA               Summary   Chief Complaint :   Not feeling well x 1wk - c/o light headed, elevated BP - has been communicating with [facility] - was concerned with some abd bloating last wk which has improved - hosp last yr due to ascites   Menstrual Status :   Menarcheal   Weight Measured :   184 lb(Converted to: 184 lb 0 oz, 83.46 kg)    Height Measured :   65.5 in(Converted to: 5 ft 5 in, 166.37 cm)    Body Mass Index :   30.15 kg/m2 (HI)    Body Surface Area :   1.96 m2   Systolic Blood Pressure :   167 mmHg (HI)    Diastolic Blood Pressure :   80 mmHg   Mean Arterial Pressure :   109 mmHg   Peripheral Pulse Rate :   68 bpm   Temperature Tympanic :   98.2 DegF(Converted to: 36.8 DegC)    Oxygen Saturation :   98 %   Carly Segura CMA - 9/17/2020 3:37 PM CDT   Health Status   Allergies Verified? :   Yes   Medication History Verified? :   Yes   Medical History Verified? :   Yes   Pre-Visit Planning Status :   Completed   Tobacco Use? :   Heavy tobacco smoker   Carly Segura CMA - 9/17/2020 3:37 PM CDT   ID Risk Screen   Recent Travel History :   No recent travel   Family Member Travel History :   No recent travel   Other Exposure to Infectious Disease :   Unknown   Carly Segura CMA - 9/17/2020 3:37 PM CDT   Social History   Social History   (As Of: 9/17/2020 3:39:37 PM CDT)   Alcohol:  Current      Wine (5 oz), 1-2 times per week   (Last Updated: 2/14/2019 9:43:14 AM CST by Josefina Santos)          Tobacco:  Past      Former smoker, quit more than 30 days ago   Comments:  6/17/2020 11:34 AM - Hayder Cid CMA: quit age 19   (Last Updated: 6/17/2020 11:34:40 AM CDT by Hayder Cid CMA)          Employment/School:        Work/School description: [employer].   (Last Updated: 2/7/2019 3:30:53 PM CST by Michael Ro MD)          Home/Environment:        Marital status: .  Lives with Children, Spouse.   1 children.   (Last Updated: 2/7/2019 3:30:41 PM CST by Jayjay BAÑUELOS, [facility])            More Vitals   Systolic Blood Pressure Supine :   148 mmHg   Diastolic Blood Pressure Supine :   81 mmHg   Systolic Blood Pressure Sitting :   149 mmHg   Diastolic Blood Pressure Sitting :   85 mmHg   Systolic Blood Pressure Standing :   138 mmHg   Diastolic Blood Pressure Standing :   92 mmHg   Pulse Supine :   76 bpm   Pulse Sitting :   60 bpm   Pulse Standing :   67 bpm   Carly Segura CMA - 9/17/2020 5:37 PM CDT

## 2022-02-16 NOTE — TELEPHONE ENCOUNTER
---------------------  From: Shantelle Young CMA (Phone Messages Pool ([id number]_[facility]))   To: Michael Ro MD;     Sent: 6/24/2019 2:02:20 PM CDT  Subject: CONSUMER MESSAGE : Hospitalized at [facility]           ---------------------  From: MAXIMILIAN SWIFT  To: [facility]  Sent: 06/24/2019 01:58 p.m. CDT  Subject: Hospitalized at [facility] Dr. Ro,  Just thought I should let you know I m at [facility]. I had severe abdominal and back pain. ER in [place] CT found fluid in the retroperitoneal space.  Dylan,  Vincent for the update. Keep me posted. Hope you are feeling better soon!  Fiordaliza Ro---------------------  From: Michael Ro MD   To: Phone [employer] Pool [id number] MAXIMILIAN SWIFT    Sent: 6/24/2019 2:10:01 PM CDT  Subject: RE: CONSUMER MESSAGE : [place]

## 2022-02-16 NOTE — TELEPHONE ENCOUNTER
"---------------------  From: Shantelle Young CMA ([employer] Message Pool ([id number]_[facility]))   To: Michael Ro MD;     Sent: 2020 11:37:12 AM CDT  Subject: Consumer Message : Dr. Ro     ---------------------  From: MAXIMILIAN SWIFT  To: [facility]  Sent: 2020 08:49 a.m. CDT  Subject: Dr. Ro  My kidneys were shutting down when I was trying to die after my . Would that cause long term kidney damage?      ---------------------  From: Dagmar Childers (Phone Messages Pool ([id number]))   To: [employer] Message Pool ([id number]_[facility]);     Sent: 2020 11:21:56 AM CDT  Subject: FW: FW: Dr. Ro           ---------------------  From: MAXIMILIAN SWIFT  To: [facility]  Sent: 2020 09:29 a.m. CDT  Subject: RE: FW: Dr. Ro  I can wait. Just wanted her to know. Have it on record.       Addendum by Dagmar Childers on 2020 8:53:10 AM CDT  ---------------------  From: Dagmar Childers (Phone Messages Pool ([id number]))  To: MAXIMILIAN SWIFT  Sent: 2020 8:53:10 AM CDT  Subject: FW: Dr. Ro  Entered by Dagmar Childers on 2020 8:52:58 AM CDT  Dr. Jayjay Benítez is out of clinic this week and will be back next week. Are you fine with waiting for a response from her until then? Or would you rather us forward to the on call doctor?       Dagmar            ---------------------  From: MAXIMILIAN SWIFT  To: [facility]  Sent: 2020 08:35 a.m. CDT  Subject: Dr. Ro  I lost 5 lbs of fluid weight over the weekend. I peed a lot throughout the night. My abdomen is back to normal. I m back to my normal weight. I was beginning to think I had just gained weight. That s partly why I didn t want to come in. \"Doc, I ve put on some weight in a short amount of time. I feel puffy and bloated\". I don t know what you would do with " that.Maximilian,  So glad that you are feeling better but I agree it is odd. Acute kidney injury in otherwise healthy patients usually fully resolves, but there is a possibility there was some minor damage done. I think it is unlikely, but it is hard to know without checking. At some point, either when you are having labs done or at your request, we could do a chemistry panel to check kidney and liver function and collect a urine specimen to see if there is any protein or other unusual findings. If all was normal, that would be reassuring.   Firodaliza Ro---------------------  From: Michael Ro MD   To: [employer] Message Pool ([id number]_[facility]); MAXIMILIAN SWIFT    Sent: 9/17/2020 9:13:09 AM CDT  Subject: RE: Consumer Message : Dr. Ro

## 2022-02-16 NOTE — NURSING NOTE
Comprehensive Intake Entered On:  3/15/2019 9:31 AM CDT    Performed On:  3/15/2019 9:27 AM CDT by Elisabeth Frye CMA               Summary   Chief Complaint :   c/op minor cold x 10 days, fluid in Right ear ad throbbing, decreased hearing   Weight Measured :   182 lb(Converted to: 182 lb 0 oz, 82.55 kg)    Systolic Blood Pressure :   126 mmHg   Diastolic Blood Pressure :   82 mmHg (HI)    Mean Arterial Pressure :   97 mmHg   Peripheral Pulse Rate :   64 bpm   BP Site :   Left arm   Pulse Site :   Radial artery   BP Method :   Manual   HR Method :   Manual   Temperature Tympanic :   97.6 DegF(Converted to: 36.4 DegC)  (LOW)    Elisabeth Frye CMA - 3/15/2019 9:27 AM CDT   Health Status   Allergies Verified? :   Yes   Medication History Verified? :   Yes   Medical History Verified? :   Yes   Tobacco Use? :   Never smoker   Elisabeth Frye CMA - 3/15/2019 9:27 AM CDT   Consents   Consent for Immunization Exchange :   Consent Granted   Consent for Immunizations to Providers :   Consent Granted   Elisabeth Frye CMA - 3/15/2019 9:27 AM CDT   Meds / Allergies   (As Of: 3/15/2019 9:31:02 AM CDT)   Allergies (Active)   No Known Medication Allergies  Estimated Onset Date:   Unspecified ; Created By:   Shantelle Young CMA; Reaction Status:   Active ; Category:   Drug ; Substance:   No Known Medication Allergies ; Type:   Allergy ; Updated By:   Shantelle Young CMA; Reviewed Date:   3/15/2019 9:29 AM CDT        Medication List   (As Of: 3/15/2019 9:31:02 AM CDT)   Home Meds    buPROPion  :   buPROPion ; Status:   Documented ; Ordered As Mnemonic:   Wellbutrin  mg/24 hours oral tablet, extended release ; Simple Display Line:   300 mg, 1 tab(s), Oral, q 24 hrs, 0 Refill(s) ; Catalog Code:   buPROPion ; Order Dt/Tm:   2/7/2019 2:42:51 PM          desogestrel-ethinyl estradiol  :   desogestrel-ethinyl estradiol ; Status:   Documented ; Ordered As Mnemonic:   Kariva oral tablet ; Simple Display Line:   1 tab(s), Oral, daily, 0  Refill(s) ; Catalog Code:   desogestrel-ethinyl estradiol ; Order Dt/Tm:   2/7/2019 2:43:10 PM          esomeprazole  :   esomeprazole ; Status:   Documented ; Ordered As Mnemonic:   NexIUM ; Simple Display Line:   40 mg, Oral, daily, 0 Refill(s) ; Catalog Code:   esomeprazole ; Order Dt/Tm:   2/7/2019 2:42:39 PM          sertraline  :   sertraline ; Status:   Documented ; Ordered As Mnemonic:   Zoloft 100 mg oral tablet ; Simple Display Line:   200 mg, 2 tab(s), Oral, daily, 0 Refill(s) ; Catalog Code:   sertraline ; Order Dt/Tm:   2/7/2019 2:42:28 PM

## 2022-02-16 NOTE — NURSING NOTE
"Comprehensive Intake Entered On:  7/31/2019 10:52 AM CDT    Performed On:  7/31/2019 10:48 AM CDT by Marycarmen Richard CMA               Summary   Chief Complaint :   c/o stiff neck, tired, headache, \"can't think right\"; went home from work yesterday; new dx of iron deficiency, was in [facility] at the end of June for transfusions   Weight Measured :   170 lb(Converted to: 170 lb 0 oz, 77.11 kg)    Height Measured :   65.5 in(Converted to: 5 ft 5 in, 166.37 cm)    Body Mass Index :   27.86 kg/m2 (HI)    Body Surface Area :   1.89 m2   Systolic Blood Pressure :   114 mmHg   Diastolic Blood Pressure :   66 mmHg   Mean Arterial Pressure :   82 mmHg   Peripheral Pulse Rate :   60 bpm   BP Site :   Right arm   Pulse Site :   Radial artery   BP Method :   Manual   HR Method :   Manual   Temperature Temporal :   97.8 DegF(Converted to: 36.6 DegC)    Marycarmen Richard CMA - 7/31/2019 10:48 AM CDT   Health Status   Allergies Verified? :   Yes   Medication History Verified? :   Yes   Medical History Verified? :   Yes   Pre-Visit Planning Status :   Completed   Tobacco Use? :   Former smoker   Marycarmen Richard CMA - 7/31/2019 10:48 AM CDT   Consents   Consent for Immunization Exchange :   Consent Granted   Consent for Immunizations to Providers :   Consent Granted   Marycarmen Richard CMA - 7/31/2019 10:48 AM CDT   Meds / Allergies   (As Of: 7/31/2019 10:52:14 AM CDT)   Allergies (Active)   No Known Medication Allergies  Estimated Onset Date:   Unspecified ; Created By:   Shantelle Young CMA; Reaction Status:   Active ; Category:   Drug ; Substance:   No Known Medication Allergies ; Type:   Allergy ; Updated By:   Shantelle Young CMA; Reviewed Date:   7/31/2019 10:50 AM CDT        Medication List   (As Of: 7/31/2019 10:52:14 AM CDT)   Prescription/Discharge Order    thyroid desiccated  :   thyroid desiccated ; Status:   Prescribed ; Ordered As Mnemonic:   Nature-Throid 97.5 mg oral tablet ; Simple Display Line:   97.5 mg, 1 tab(s), Oral, daily, 90 " tab(s), 3 Refill(s) ; Ordering Provider:   Michael Ro MD; Catalog Code:   thyroid desiccated ; Order Dt/Tm:   7/1/2019 7:58:11 AM            Home Meds    buPROPion  :   buPROPion ; Status:   Documented ; Ordered As Mnemonic:   Wellbutrin  mg/24 hours oral tablet, extended release ; Simple Display Line:   300 mg, 1 tab(s), Oral, q 24 hrs, 0 Refill(s) ; Catalog Code:   buPROPion ; Order Dt/Tm:   2/7/2019 2:42:51 PM          desogestrel-ethinyl estradiol  :   desogestrel-ethinyl estradiol ; Status:   Documented ; Ordered As Mnemonic:   Kariva oral tablet ; Simple Display Line:   1 tab(s), Oral, daily, 0 Refill(s) ; Catalog Code:   desogestrel-ethinyl estradiol ; Order Dt/Tm:   2/7/2019 2:43:10 PM          esomeprazole  :   esomeprazole ; Status:   Documented ; Ordered As Mnemonic:   NexIUM ; Simple Display Line:   40 mg, Oral, daily, 0 Refill(s) ; Catalog Code:   esomeprazole ; Order Dt/Tm:   2/7/2019 2:42:39 PM          sertraline  :   sertraline ; Status:   Documented ; Ordered As Mnemonic:   Zoloft 100 mg oral tablet ; Simple Display Line:   200 mg, 2 tab(s), Oral, daily, 0 Refill(s) ; Catalog Code:   sertraline ; Order Dt/Tm:   2/7/2019 2:42:28 PM

## 2022-02-16 NOTE — TELEPHONE ENCOUNTER
"---------------------  From: Domi Mcpherson CMA (Phone Messages Pool ([id number]_[facility]))   To: Phone Messages Pool (57624_[facility]);     Sent: 8/12/2019 3:24:16 PM CDT  Subject: FW: Referral Letter           ---------------------  From: BELKIS SWIFT  To: [facility]  Sent: 08/12/2019 02:53 p.m. CDT  Subject: Referral Letter  Hi Shilpa Agudelo says there is Incomplete Information on the referral letter for the Pillcam, which was completed last week.  They say they need your name on it, which it is.  I didn t have any trouble with the colonoscopy but now this.  There was a lesion found on the small bowel so they want me to do a CT Enterography which I d like done in [place] if possible, but I need another referral.    Also, my PCM is \"unassigned\" in my  profile, and I ve tried to get you listed, but they can t find you in their system and therefor want to send me to [place], which I don t want to do.    Belkis---------------------  From: Prisca Leslie MA (Phone Messages Pool ([id number]_[facility]))   To: Michael Ro MD;     Sent: 8/12/2019 3:26:29 PM CDT  Subject: CONSUMER MESSAGE: Referral LetterBelkis,  I'm going to pass this message on to our referral office as they sometimes understand nuances in referral scheduling that I don't understand. In terms of insurance, we have recently been acquired by [employer], so I don't know if that is the issue with insurance? Our business office may be able to confirm. If you call [phone] and ask for the business office, they may be able to give you direction regarding  insurance.  Thanks,  Fiordaliza Ro---------------------  From: Michael Ro MD   To: Phone Messages Pool (32224_[place] [facility] [place]); BELKIS SWIFT    Sent: 8/12/2019 3:39:05 PM CDT  Subject: RE: CONSUMER MESSAGE: Referral Letter---------------------  From: Michael Ro MD   To: Referral Coordinators Pool " (32224_[facility]);     Sent: 8/12/2019 3:39:45 PM CDT  Subject: FW: CONSUMER MESSAGE: Referral Letter     Can you see if you can figure out what  needs for her to get the pillcam done? Thanks---------------------  From: Bonnie Sam (Referral Coordinators Pool (32224_[facility]))   To: Michael Ro MD;     Sent: 8/12/2019 4:22:27 PM CDT  Subject: RE: CONSUMER MESSAGE: Referral Letter     I spoke with  [facility] and they stated you are not in network (before 8/1 and currently) I called patient and LM with what  told me she would need to try but ultimately she may need to go to where in network.Thank you---------------------  From: Michael Ro MD   To: Referral Coordinators Pool (32224_[facility]);     Sent: 8/12/2019 4:29:57 PM CDT  Subject: RE: CONSUMER MESSAGE: Referral Letter

## 2022-02-16 NOTE — TELEPHONE ENCOUNTER
---------------------  From: Shantelle Young CMA   To: Jayjay BAÑUELOS, [facility];     Sent: 6/26/2019 2:50:44 PM CDT  Subject: General Message     Dr. Kennedy from [facility] called to let us know that they will be faxing over her discharge summery. He also stated that patient will need to have 3 more iron transfusions. She did have an EGD done and biopsy taken that have not been resulted on yet.  Waiting for fax.     Shantelle Young....GINNYNoted. Thanks

## 2022-02-16 NOTE — LETTER
(Inserted Image. Unable to display)     December 28, 2020      MAXIMILIAN SWIFT   [address] [place], [place] [id number]          Dear MAXIMILIAN,      Thank you for selecting [facility] ([facility]viously [facility]) for your healthcare needs.    Our records indicate you are due for the following services:    Follow-up office visit / Medication Check.    Non-Fasting Labs.    If you had your labs done at another facility or with Direct Access Lab Testing at [facility], please bring in a copy of the results to your next visit, mail a copy, or drop off a copy of your results to your Healthcare Provider.    (FYI   Regarding office visits: In some instances, a video visit or telephone visit may be offered as an option.)    You are due for lab work and an office visit; please schedule the lab appointment 1 week before the office visit.  This will assure all results are available to discuss with your Healthcare Provider during your visit.    **It is very helpful if you bring your medication bottles to your appointment.  This assures we have all of your current medications, including strength and dosing information, documented accurately in your medical record.    To schedule an appointment or if you have further questions, please contact your clinic at [phone].      Powered by [employer]    Sincerely,    Michael Ro MD

## 2022-02-16 NOTE — TELEPHONE ENCOUNTER
---------------------  From: Magdalena Monterroso LPN (Phone Messages Pool (32224[facility]))   To: [place] Message Pool (32224[facility]);     Sent: 9/9/2020 9:25:02 AM CDT  Subject: CONSUMER MESSAGE FW: Dr. Nettles           ---------------------  From: MAXIMILIAN SWIFT  To: [facility]  Sent: 09/09/2020 09:15 a.m. CDT  Subject: Dr. Nettles  I m not in pain.  My belly stays bigger than normal and sometimes it s quite big.  It s like last year when I couldn t figure out why I was gaining weight so quickly.  I don t know that there is anything to do about it.  If my neck starts to swell up again I ll come in. The shortness of breath comes and goes. Notice it most when laying down but it s not terrible.---------------------  From: Shantelle Young CMA ([place] Message Pool (32224[facility]))   To: Michael Ro MD;     Sent: 9/9/2020 9:41:30 AM CDT  Subject:  CONSUMER MESSAGE FW: Dr. Mccollum,  OK - just keep us posted if we can be of assistance.  Fiordaliza Ro---------------------  From: Michael Ro MD   To: [place] Message Pool (32224[facility]); MAXIMILIAN SWIFT    Sent: 9/9/2020 9:57:25 AM CDT  Subject: RE: CONSUMER MESSAGE FW: Dr. Rodriguez

## 2022-02-16 NOTE — TELEPHONE ENCOUNTER
---------------------From: Gabriella George MA (Phone Messages Pool ([id number][facility] To: Michael Ro MD; Philip BAÑUELOS, Jas;   Sent: 10/14/2020 7:55:52 AM CDTSubject: FW: Morning ECG ---------------------From: MAXIMILIAN Duron[facility]: 10/14/2020 07:37 a.m. CDTSubject: Morning ECGThis was taken before I got out of bed.Dylan,From what I can see, both of the ECGs you have sent were completely normal sinus rhythm. No irregular beat. The rate of both of them is reasonable. Nothing on these tracings that are out of the ordinary.Fiordaliza Ro---------------------From: Michael Ro MD To: Phone Messages Pool ([id number]_[place] [employer]); MAXIMILIAN SWIFT  Sent: 10/14/2020 8:06:30 AM CDTSubject: RE: Morning ECG

## 2022-02-16 NOTE — TELEPHONE ENCOUNTER
---------------------From: Danette Gruber CMA (Phone Messages Pool (32224_[facility])) To: Michael Ro MD;   Sent: 10/13/2020 3:26:27 PM CDTSubject: FW: ECG ---------------------From: MAXIMILIAN Duron: [facility]Sent: 10/13/2020 03:24 p.m. CDTSubject: ECGThoughts?  I have more interesting ones as well.see second message

## 2022-02-16 NOTE — NURSING NOTE
Comprehensive Intake Entered On:  6/10/2019 10:25 AM CDT    Performed On:  6/10/2019 10:18 AM CDT by Shantelle Young CMA               Summary   Chief Complaint :   pain in right thumb; clicking   Weight Measured :   173.0 lb(Converted to: 173 lb 0 oz, 78.47 kg)    Height Measured :   65.5 in(Converted to: 5 ft 5 in, 166.37 cm)    Body Mass Index :   28.35 kg/m2 (HI)    Body Surface Area :   1.9 m2   Systolic Blood Pressure :   108 mmHg   Diastolic Blood Pressure :   60 mmHg   Mean Arterial Pressure :   76 mmHg   Peripheral Pulse Rate :   60 bpm   BP Method :   Manual   HR Method :   Manual   Shantelle Young CMA - 6/10/2019 10:18 AM CDT   Health Status   Allergies Verified? :   Yes   Medication History Verified? :   Yes   Medical History Verified? :   Yes   Pre-Visit Planning Status :   Completed   Tobacco Use? :   Former smoker   Shantelle Young CMA - 6/10/2019 10:18 AM CDT   Consents   Consent for Immunization Exchange :   Consent Granted   Consent for Immunizations to Providers :   Consent Granted   Shantelle Young CMA - 6/10/2019 10:18 AM CDT   Meds / Allergies   (As Of: 6/10/2019 10:25:18 AM CDT)   Allergies (Active)   No Known Medication Allergies  Estimated Onset Date:   Unspecified ; Created By:   Shantelle Young CMA; Reaction Status:   Active ; Category:   Drug ; Substance:   No Known Medication Allergies ; Type:   Allergy ; Updated By:   Shantelle Young CMA; Reviewed Date:   6/10/2019 10:24 AM CDT        Medication List   (As Of: 6/10/2019 10:25:18 AM CDT)   Prescription/Discharge Order    amoxicillin  :   amoxicillin ; Status:   Completed ; Ordered As Mnemonic:   amoxicillin 875 mg oral tablet ; Simple Display Line:   875 mg, 1 tab(s), PO, BID, for 10 day(s), 20 tab(s), 0 Refill(s) ; Ordering Provider:   Lemuel Concepcion PA-C; Catalog Code:   amoxicillin ; Order Dt/Tm:   3/15/2019 9:44:52 AM            Home Meds    buPROPion  :   buPROPion ; Status:   Documented ; Ordered As Mnemonic:   Wellbutrin  mg/24 hours  oral tablet, extended release ; Simple Display Line:   300 mg, 1 tab(s), Oral, q 24 hrs, 0 Refill(s) ; Catalog Code:   buPROPion ; Order Dt/Tm:   2/7/2019 2:42:51 PM          desogestrel-ethinyl estradiol  :   desogestrel-ethinyl estradiol ; Status:   Documented ; Ordered As Mnemonic:   Kariva oral tablet ; Simple Display Line:   1 tab(s), Oral, daily, 0 Refill(s) ; Catalog Code:   desogestrel-ethinyl estradiol ; Order Dt/Tm:   2/7/2019 2:43:10 PM          esomeprazole  :   esomeprazole ; Status:   Documented ; Ordered As Mnemonic:   NexIUM ; Simple Display Line:   40 mg, Oral, daily, 0 Refill(s) ; Catalog Code:   esomeprazole ; Order Dt/Tm:   2/7/2019 2:42:39 PM          sertraline  :   sertraline ; Status:   Documented ; Ordered As Mnemonic:   Zoloft 100 mg oral tablet ; Simple Display Line:   200 mg, 2 tab(s), Oral, daily, 0 Refill(s) ; Catalog Code:   sertraline ; Order Dt/Tm:   2/7/2019 2:42:28 PM          thyroid desiccated  :   thyroid desiccated ; Status:   Documented ; Ordered As Mnemonic:   Nature-Throid 97.5 mg oral tablet ; Simple Display Line:   97.5 mg, 1 tab(s), Oral, daily, 0 Refill(s) ; Catalog Code:   thyroid desiccated ; Order Dt/Tm:   6/10/2019 10:24:02 AM

## 2022-02-16 NOTE — PROGRESS NOTES
Patient:   MAXIMILIAN SWIFT            MRN: [id number]            FIN: [id number]               Age:   39 years     Sex:  Female     :  1980   Associated Diagnoses:   Right acute suppurative otitis media   Author:   Lemuel Concepcion PA-C      Report Summary   Diagnosis  Right acute suppurative otitis media ([id number] H66.001).  Patient InstructionsOrders   Visit Information      Date of Service: 03/15/2019 09:25 am  Performing Location: [facility]  Encounter#: [id number]      Primary Care Provider (PCP):  Michael Ro MD    NPI# [id number]      Referring Provider:  Lemuel Concepcion PA-C    NPI# [id number]   Visit type:  New symptom.    Source of history:  Self.    Referral source:  Self.    History limitation:  None.       Chief Complaint   3/15/2019 9:38 AM CDT    c/o minor cold x 10 days, fluid in Right ear ad throbbing, decreased hearing  (Modified)       History of Present Illness             The patient presents with sinus problem.  The sinus problem is located in the maxillary sinus.  The sinus problem is characterized by nasal congestion, rhinorrhea and facial pain.  The severity of the sinus problem is moderate.  The sinus problem is episodic, fluctuates in intensity and is worsening.  The sinus problem has lasted for 1.5 week(s).  Associated symptoms consist of ear pain and denies cough.  CC above noted and confirmed with the patient..        Review of Systems   Constitutional:  Negative except as documented in history of present illness.    Eye:  Negative.    Ear/Nose/Mouth/Throat:  Negative except as documented in history of present illness.    Respiratory:  Negative except as documented in history of present illness.       Health Status   Allergies:    Allergic Reactions (All)  No Known Medication Allergies   Medications:  (Selected)   Prescriptions  Prescribed  amoxicillin 875 mg oral tablet: = 1 tab(s) ( 875 mg ), PO, BID, # 20 tab(s), 0 Refill(s), Type: Maintenance, Pharmacy: [id number], 1 tab(s) Oral bid,x10 day(s)  Documented Medications  Documented  Kariva oral tablet: 1 tab(s), Oral, daily, 0 Refill(s), Type: Maintenance  NexIUM: ( 40 mg ), Oral, daily, 0 Refill(s), Type: Maintenance  Wellbutrin  mg/24 hours oral tablet, extended release: = 1 tab(s) ( 300 mg ), Oral, q 24 hrs, 0 Refill(s), Type: Maintenance  Zoloft 100 mg oral tablet: = 2 tab(s) ( 200 mg ), Oral, daily, 0 Refill(s), Type: Maintenance,    Medications          *denotes recorded medication          amoxicillin 875 mg oral tablet: 875 mg, 1 tab(s), PO, BID, for 10 day(s), 20 tab(s), 0 Refill(s).          *Wellbutrin  mg/24 hours oral tablet, extended release: 300 mg, 1 tab(s), Oral, q 24 hrs, 0 Refill(s).          *Kariva oral tablet: 1 tab(s), Oral, daily, 0 Refill(s).          *NexIUM: 40 mg, Oral, daily, 0 Refill(s).          *Zoloft 100 mg oral tablet: 200 mg, 2 tab(s), Oral, daily, 0 Refill(s).     Problem list:    All Problems  Peptic ulcer disease / SNOMED CT 91265366 / Confirmed  Moderate major depression / SNOMED CT 5777491 / Confirmed  Chronic back pain / SNOMED CT 836787949 / Confirmed  Resolved: Pregnancy / SNOMED CT 142475911  Resolved: Disseminated intravascular coagulation / SNOMED CT 783212432      Histories   Past Medical History:    Active  Moderate major depression (5548239)  Peptic ulcer disease (78479708)  Chronic back pain (739299375)  Resolved  Disseminated intravascular coagulation (450002889):  Resolved.  Comments:  2019 CST 3:29 PM Michael Bonilla MD  following   Pregnancy (226253098):  Resolved on 2016 at 36 years.   Family History:    BCC - Basal cell carcinoma  Sister  Hypertension  Father  Liver damage  Grandfather (M)  [place] - Cancer  Father  Comments:  2019 9:42 AM CST - Josefina Santos  Esophageal  Alcoholism  Father  Grandfather (M)  Depression  Father     Procedure history:     section (32411447) on 2016 at 36 Years.  Laparotomy  (144508470) on 2016 at 36 Years.  Comments:  2019 3:28 PM JASMIN - Jayjay BAÑUELOS, Michael  done for postpartum hemorrhage following    Social History:        Alcohol Assessment: Current            Wine (5 oz), 1-2 times per week      Tobacco Assessment: Past      Employment and Education Assessment            Work/School description: [employer].      Home and Environment Assessment            Marital status: .  Lives with Children, Spouse.  1 children.      Physical Examination   Vital Signs   3/15/2019 9:27 AM CDT Temperature Tympanic 97.6 DegF  LOW    Peripheral Pulse Rate 64 bpm    Pulse Site Radial artery    HR Method Manual    Systolic Blood Pressure 126 mmHg    Diastolic Blood Pressure 82 mmHg  HI    Mean Arterial Pressure 97 mmHg    BP Site Left arm    BP Method Manual      Measurements from flowsheet : Measurements   3/15/2019 9:27 AM CDT    Weight Measured - Standard                182 lb     General:  Alert and oriented, No acute distress.    Eye:  Pupils are equal, round and reactive to light, Extraocular movements are intact, Normal conjunctiva.    HENT:  Normocephalic, Tympanic membranes are clear, Oral mucosa is moist.         Ear: Right ear, Tympanic membrane ( Intact, Erythematous, Fluid in middle ear ).    Neck:  Supple, Non-tender, No lymphadenopathy.    Respiratory:  Lungs are clear to auscultation, Respirations are non-labored.    Cardiovascular:  Normal rate, Regular rhythm, No murmur.       Impression and Plan   Diagnosis     Right acute suppurative otitis media ([id number] H66.001).     Patient Instructions:       Counseled: Patient, Regarding diagnosis, Regarding medications, Activity, Verbalized understanding.    Orders     Orders (Selected)   Prescriptions  Prescribed  amoxicillin 875 mg oral tablet: = 1 tab(s) ( 875 mg ), PO, BID, # 20 tab(s), 0 Refill(s), Type: Maintenance, Pharmacy: [id number], 1 tab(s) Oral bid,x10 day(s).     Take medicine as prescribed,  side effects discussed.  Tylenol/ibuprofen for fever and discomfort.  Push fluids.  RTC if not improving in 36-48 hours, prior if concerns as we have discussed.  Decongestants.

## 2022-02-16 NOTE — NURSING NOTE
Quick Intake Entered On:  9/17/2020 3:29 PM CDT    Performed On:  9/17/2020 3:24 PM CDT by Maye Echols RN               Summary   Chief Complaint :   Bp check. Pt complains of Lightheadedness and feels like her heart is pounding out of her chest. 1 year ago had a ascites and was hospitalized for this. had the same feeling as then about 2 weeks ago.    Menstrual Status :   Menarcheal   Height Measured :   65.5 in(Converted to: 5 ft 5 in, 166.37 cm)    Systolic Blood Pressure :   166 mmHg (HI)    Diastolic Blood Pressure :   95 mmHg (HI)    Mean Arterial Pressure :   119 mmHg   Peripheral Pulse Rate :   73 bpm   BP Site :   Right arm   BP Method :   Electronic   HR Method :   Electronic   Maye Echols RN - 9/17/2020 3:24 PM CDT

## 2022-02-16 NOTE — NURSING NOTE
Comprehensive Intake Entered On:  8/1/2019 3:53 PM CDT    Performed On:  8/1/2019 3:50 PM CDT by Shantelle Young CMA               Summary   Chief Complaint :   review labs   Weight Measured :   170.0 lb(Converted to: 170 lb 0 oz, 77.11 kg)    Height Measured :   65.5 in(Converted to: 5 ft 5 in, 166.37 cm)    Body Mass Index :   27.86 kg/m2 (HI)    Body Surface Area :   1.89 m2   Systolic Blood Pressure :   110 mmHg   Diastolic Blood Pressure :   60 mmHg   Mean Arterial Pressure :   77 mmHg   Peripheral Pulse Rate :   70 bpm   Oxygen Saturation :   98 %   Shantelle Young CMA - 8/1/2019 3:50 PM CDT   Health Status   Allergies Verified? :   Yes   Medication History Verified? :   Yes   Medical History Verified? :   Yes   Pre-Visit Planning Status :   Completed   Tobacco Use? :   Former smoker   Shantelle Young CMA - 8/1/2019 3:50 PM CDT   Consents   Consent for Immunization Exchange :   Consent Granted   Consent for Immunizations to Providers :   Consent Granted   Shantelle Young CMA - 8/1/2019 3:50 PM CDT   Meds / Allergies   (As Of: 8/1/2019 3:53:45 PM CDT)   Allergies (Active)   No Known Medication Allergies  Estimated Onset Date:   Unspecified ; Created By:   Shantelle Young CMA; Reaction Status:   Active ; Category:   Drug ; Substance:   No Known Medication Allergies ; Type:   Allergy ; Updated By:   Shantelle Young CMA; Reviewed Date:   8/1/2019 3:52 PM CDT        Medication List   (As Of: 8/1/2019 3:53:45 PM CDT)   Prescription/Discharge Order    thyroid desiccated  :   thyroid desiccated ; Status:   Prescribed ; Ordered As Mnemonic:   Nature-Throid 97.5 mg oral tablet ; Simple Display Line:   97.5 mg, 1 tab(s), Oral, daily, 90 tab(s), 3 Refill(s) ; Ordering Provider:   Michael Ro MD; Catalog Code:   thyroid desiccated ; Order Dt/Tm:   7/1/2019 7:58:11 AM            Home Meds    buPROPion  :   buPROPion ; Status:   Documented ; Ordered As Mnemonic:   Wellbutrin  mg/24 hours oral tablet, extended release ; Simple  Display Line:   300 mg, 1 tab(s), Oral, q 24 hrs, 0 Refill(s) ; Catalog Code:   buPROPion ; Order Dt/Tm:   2/7/2019 2:42:51 PM          desogestrel-ethinyl estradiol  :   desogestrel-ethinyl estradiol ; Status:   Documented ; Ordered As Mnemonic:   Kariva oral tablet ; Simple Display Line:   1 tab(s), Oral, daily, 0 Refill(s) ; Catalog Code:   desogestrel-ethinyl estradiol ; Order Dt/Tm:   2/7/2019 2:43:10 PM          esomeprazole  :   esomeprazole ; Status:   Documented ; Ordered As Mnemonic:   NexIUM ; Simple Display Line:   40 mg, Oral, daily, 0 Refill(s) ; Catalog Code:   esomeprazole ; Order Dt/Tm:   2/7/2019 2:42:39 PM          sertraline  :   sertraline ; Status:   Documented ; Ordered As Mnemonic:   Zoloft 100 mg oral tablet ; Simple Display Line:   200 mg, 2 tab(s), Oral, daily, 0 Refill(s) ; Catalog Code:   sertraline ; Order Dt/Tm:   2/7/2019 2:42:28 PM

## 2022-02-16 NOTE — TELEPHONE ENCOUNTER
Entered by Gabriella George MA on September 21, 2020 10:33:29 AM CDT  ---------------------  From: Gabriella George MA   To: [facility]    Sent: 9/21/2020 10:33:29 AM CDT  Subject: Medication Management     ** Submitted: **  Order:thyroid desiccated (Nature-Throid 97.5 mg oral tablet)  1 tab(s)  Oral  daily  Qty:  90 tab(s)        Days Supply:  0        Refills:  0          Substitutions Allowed     Route To Pharmacy - [facility]    Signed by Gabriella George MA  9/21/2020 3:32:00 PM [facility]    ** Submitted: **  Complete:thyroid desiccated (Nature-Throid 97.5 mg oral tablet)   Signed by Gabriella George MA  9/21/2020 3:33:00 PM [facility]    ** Not Approved:  **  thyroid desiccated (NATURE THROID TABS 1.5GR 97.5MG)  TAKE 1 TABLET DAILY  Qty:  90 tab(s)        Days Supply:  0        Refills:  3          Substitutions Allowed     Route To Pharmacy - [facility]   Signed by Gabriella George MA            ------------------------------------------  From: [facility]  To: Michael Ro MD  Sent: September 19, 2020 2:26:46 AM CDT  Subject: Medication Management  Due: September 9, 2020 4:21:06 PM CDT     ** On Hold Pending Signature **     Dispensed Drug: thyroid desiccated (Nature-Throid 97.5 mg oral tablet), TAKE 1 TABLET DAILY  Quantity: 90 tab(s)  Days Supply: 0  Refills: 3  Substitutions Allowed  Notes from Pharmacy:  ------------------------------------------LV:  9/17/2020 w/ [facility] for lightheadedness, labs done.  RTC due October 2020 for med check.

## 2022-02-16 NOTE — TELEPHONE ENCOUNTER
---------------------  From: Danika Suárez   To: Jayjay BAÑUELOS, Michael;     Sent: 9/9/2020 8:09:58 AM CDT  Subject: Scheduling Management     Patient cancelled appointment for today. Appointment was for ascites. She did not wish to rescheduleNoted - thanks.

## 2022-02-16 NOTE — TELEPHONE ENCOUNTER
---------------------  From: Loyda Christian CMA (Phone Messages Pool ([id number]_[employer])   To: Mike Alvarez MD;     Sent: 6/27/2019 8:38:05 AM CDT  Subject: General Message-Iron transfusions     [facility] oc until Monday 7/1-T TR    Phone Message:      PCP: [facility]    Person Calling: Paloma  Phone: [phone]  Time: 8:05am    Reason for call: Pt called stating that Dr. Kennedy from [facility] was going to be calling Dr. Ro yesterday regarding D/C paperwork and that pt is needing 3 more iron transfusions. Pt wondering if paperwork was received yet or how she needs to proceed with setting up the iron transfusions. Please advise.---------------------  From: Mike Alvarez MD   To: Phone [employer] ([id number]_[employer]);     Sent: 6/27/2019 12:01:38 PM CDT  Subject: RE: General Message-Iron transfusions     No paperwork in chart.  OK to 3 more iron injections at previous dose and schedule.Patient called stating she is needing to get these iron infusions set up for next week. # [phone]---------------------  From: Shantelle Young CMA (Phone Messages Pool ([id number]))   To: Michael Ro MD;     Sent: 7/1/2019 7:55:24 AM CDT  Subject: FW: General Message-Iron transfusions     Patient is seeing [facility] today in Noted.

## 2022-02-16 NOTE — TELEPHONE ENCOUNTER
---------------------  From: Prisca Leslie MA (Phone Messages Pool ([id number]_[place] [facility] [place]))   Sent: 6/17/2020 10:42:28 AM CDT  Subject: Phone Note: Issues      PCP:   GALEN      Time of Call:  10:20       Person Calling:  Belkis  Phone number:      Returned call at: _    Note:  Patient call complaining of nausea, diarrhea, light headed,  does work outside typically has these issues when its humid outside. Patient feels she should not be feeling like this being as its not horribly hot or humid outside this morning. Recommended she have a visit with a provider today be as this is abnormal for her.    Pharmacy: n/a    Last office visit and reason:  3/30/20    Transferred to: n/a

## 2022-02-16 NOTE — NURSING NOTE
Comprehensive Intake Entered On:  2/10/2020 9:28 AM CST    Performed On:  2/10/2020 9:24 AM CST by Shantelle Young CMA               Summary   Chief Complaint :   Eye Irritation; right eye was mattery;    Weight Measured :   176.2 lb(Converted to: 176 lb 3 oz, 79.92 kg)    Height Measured :   65.5 in(Converted to: 5 ft 5 in, 166.37 cm)    Body Mass Index :   28.87 kg/m2 (HI)    Body Surface Area :   1.92 m2   Systolic Blood Pressure :   102 mmHg   Diastolic Blood Pressure :   60 mmHg   Mean Arterial Pressure :   74 mmHg   Peripheral Pulse Rate :   60 bpm   BP Method :   Manual   HR Method :   Electronic   Oxygen Saturation :   96 %   Shantelle Young CMA - 2/10/2020 9:24 AM CST   Health Status   Allergies Verified? :   Yes   Medication History Verified? :   Yes   Medical History Verified? :   Yes   Pre-Visit Planning Status :   Completed   Shantelle Young CMA - 2/10/2020 9:24 AM CST   Consents   Consent for Immunization Exchange :   Consent Granted   Consent for Immunizations to Providers :   Consent Granted   Shantelle Young CMA - 2/10/2020 9:24 AM CST   Meds / Allergies   (As Of: 2/10/2020 9:28:11 AM CST)   Allergies (Active)   No Known Medication Allergies  Estimated Onset Date:   Unspecified ; Created By:   Shantelle Young CMA; Reaction Status:   Active ; Category:   Drug ; Substance:   No Known Medication Allergies ; Type:   Allergy ; Updated By:   Shantelle Young CMA; Reviewed Date:   2/10/2020 9:27 AM CST        Medication List   (As Of: 2/10/2020 9:28:11 AM CST)   Prescription/Discharge Order    desogestrel-ethinyl estradiol  :   desogestrel-ethinyl estradiol ; Status:   Prescribed ; Ordered As Mnemonic:   Kariva oral tablet ; Simple Display Line:   1 tab(s), Oral, daily, skip last week of placebo and start new pack, 84 tab(s), 5 Refill(s) ; Ordering Provider:   Michael Ro MD; Catalog Code:   desogestrel-ethinyl estradiol ; Order Dt/Tm:   12/26/2019 1:11:49 PM CST          esomeprazole  :   esomeprazole ; Status:    Prescribed ; Ordered As Mnemonic:   NexIUM 40 mg oral delayed release capsule ; Simple Display Line:   40 mg, 1 cap(s), Oral, daily, 90 cap(s), 3 Refill(s) ; Ordering Provider:   Michael Ro MD; Catalog Code:   esomeprazole ; Order Dt/Tm:   10/22/2019 2:52:39 PM CDT          thyroid desiccated  :   thyroid desiccated ; Status:   Prescribed ; Ordered As Mnemonic:   Nature-Throid 97.5 mg oral tablet ; Simple Display Line:   97.5 mg, 1 tab(s), Oral, daily, 90 tab(s), 3 Refill(s) ; Ordering Provider:   Michael Ro MD; Catalog Code:   thyroid desiccated ; Order Dt/Tm:   10/11/2019 10:11:31 AM CDT            Home Meds    buPROPion  :   buPROPion ; Status:   Documented ; Ordered As Mnemonic:   Wellbutrin  mg/24 hours oral tablet, extended release ; Simple Display Line:   300 mg, 1 tab(s), Oral, q 24 hrs, 0 Refill(s) ; Catalog Code:   buPROPion ; Order Dt/Tm:   2/7/2019 2:42:51 PM CST          sertraline  :   sertraline ; Status:   Documented ; Ordered As Mnemonic:   Zoloft 100 mg oral tablet ; Simple Display Line:   200 mg, 2 tab(s), Oral, daily, 0 Refill(s) ; Catalog Code:   sertraline ; Order Dt/Tm:   2/7/2019 2:42:28 PM CST

## 2022-02-16 NOTE — NURSING NOTE
Comprehensive Intake Entered On:  7/22/2019 3:52 PM CDT    Performed On:  7/22/2019 3:52 PM CDT by Marycarmen Richard CMA               Summary   Chief Complaint :   BP Check   Systolic Blood Pressure :   126 mmHg   Diastolic Blood Pressure :   76 mmHg   Mean Arterial Pressure :   93 mmHg   Peripheral Pulse Rate :   68 bpm   BP Site :   Right arm   Pulse Site :   Radial artery   BP Method :   Manual   HR Method :   Manual   Marycarmen Richard CMA - 7/22/2019 3:52 PM CDT

## 2022-02-16 NOTE — TELEPHONE ENCOUNTER
"Entered by Dagmar Childers on September 14, 2020 8:52:58 AM CDT  Hi Dr. Jayjay Tamayo is out of clinic this week and will be back next week. Are you fine with waiting for a response from her until then? Or would you rather us forward to the on call doctor?    Dagmar       ---------------------  From: MAXIMILIAN SWIFT  [facility]  Sent: 09/14/2020 08:35 a.m. CDT  Subject: Dr. Ro  I lost 5 lbs of fluid weight over the weekend. I peed a lot throughout the night. My abdomen is back to normal. I m back to my normal weight. I was beginning to think I had just gained weight. That s partly why I didn t want to come in. \"Doc, I ve put on some weight in a short amount of time. I feel puffy and bloated\". I don t know what you would do with that.---------------------  From: Dagmar Childers (Phone Messages Pool ([id number]_[facility]))   To: MAXIMILIAN SWIFT    Sent: 9/14/2020 8:53:10 AM CDT  Subject: FW: Dr. Ro  "

## 2022-02-16 NOTE — PROGRESS NOTES
Patient:   BELKIS SWIFT            MRN: [id number]            FIN: [id number]               Age:   40 years     Sex:  Female     :  1980   Associated Diagnoses:   Fatigue; Lightheaded; Palpitation   Author:   Jas Palacios MD      Visit Information      Date of Service: 2020 03:26 pm  Performing Location: [facility]  Encounter#: [id number]      Primary Care Provider (PCP):  Michael Ro MD    NPI# [id number]      Referring Provider:  Jas Palacios MD    NPI# [id number]      Chief Complaint       2020 3:37 PM CDT Not feeling well x 1wk - c/o light headed, elevated BP - has been communicating with [facility] - was concerned with some abd bloating last wk which has improved - hosp last yr due to ascites   2020 3:24 PM CDT Bp check. Pt complains of Lightheadedness and feels like her heart is pounding out of her chest. 1 year ago had a ascites and was hospitalized for this. had the same feeling as then about 2 weeks ago.               Additional Information:No additional information recorded during visit.   Chief complaint and symptoms as noted above and confirmed with patient.  Recent lab and diagnostic studies reviewed with patient      History of Present Illness   2020: Belkis presents with one-week history of persistent lightheadedness, weakness, palpitations.  She states she has had these types of symptoms in the past though usually shorter lasting.  Now feels like symptoms persisting through the day.  No described presyncopal symptoms.  Symptoms remind her to how she felt approximately a year ago when admitted to [facility] for mysterious illness involving transient ascites and profound anemia.  She recalls inpatient work-up for suspected blood loss including endoscopy and later with colonoscopy, pill camera studies, and CT enterography.  She was told that ultimately there was no clear cause for her illness and referred to infusion center for IV iron infusions.  Also of note  she had a complicated pregnancy at age 36 leading to significant postpartum hemorrhage requiring medevac to [place].         Review of Systems   Constitutional:  Fatigue, Decreased activity, No fever, No chills.    Eye:  Negative except as documented in history of present illness.    Ear/Nose/Mouth/Throat:  Negative except as documented in history of present illness.    Respiratory:  No shortness of breath.    Cardiovascular:  Palpitations, No chest pain, No peripheral edema, No syncope.    Gastrointestinal:  No nausea, No vomiting, No abdominal pain.    Genitourinary:  No dysuria, No hematuria.    Hematology/Lymphatics:  Negative except as documented in history of present illness.    Endocrine:  No excessive thirst, No polyuria.    Immunologic:  No recurrent fevers.    Musculoskeletal:  No joint pain, No muscle pain.    Neurologic:  Alert and oriented X4, Headache, No numbness, No tingling.       Health Status   Allergies:    Allergic Reactions (Selected)  No Known Medication Allergies   Medications:  (Selected)   Prescriptions  Prescribed  Imitrex 100 mg oral tablet: = 1 tab(s) ( 100 mg ), Oral, once, PRN: for migraine headache, # 9 tab(s), 5 Refill(s), Type: Soft Stop, Pharmacy: [facility] , 1 tab(s) Oral once,PRN:for migraine headache, 65.5, in, 06/17/20 11:32:00 CDT, Height Measured, 176...  Kariva oral tablet: 1 tab(s), Oral, daily, Instructions: skip last week of placebo and start new pack, # 84 tab(s), 5 Refill(s), Type: Maintenance, Pharmacy: [facility], 1 tab(s) Oral daily,Instr:skip last week of placebo and start new pack  Nature-Throid 97.5 mg oral tablet: = 1 tab(s) ( 97.5 mg ), Oral, daily, # 90 tab(s), 3 Refill(s), Type: Maintenance, Pharmacy: [facility], 1 tab(s) Oral daily  NexIUM 40 mg oral delayed release capsule: = 1 cap(s) ( 40 mg ), Oral, daily, # 90 cap(s), 3 Refill(s), Type: Maintenance, Pharmacy: [facility], 1  cap(s) Oral daily  Zofran ODT 8 mg oral tablet, disintegrating: = 1 tab(s) ( 8 mg ), po, q8 hrs, PRN: for nausea/vomiting, # 12 tab(s), 1 Refill(s), Type: Maintenance, Pharmacy: [facility] , 1 tab(s) Oral q8 hrs,PRN:for nausea/vomiting, 65.5, in, 20 11:32:00 CDT, Height Measured, 176....  Zoloft 100 mg oral tablet: = 2 tab(s) ( 200 mg ), Oral, daily, # 180 tab(s), 2 Refill(s), Type: Maintenance, Pharmacy: [facility], 2 tab(s) Oral daily  Documented Medications  Documented  Wellbutrin  mg/24 hours oral tablet, extended release: = 1 tab(s) ( 300 mg ), Oral, q 24 hrs, 0 Refill(s), Type: Maintenance,    Medications          *denotes recorded medication          Imitrex 100 mg oral tablet: 100 mg, 1 tab(s), Oral, once, PRN: for migraine headache, 9 tab(s), 5 Refill(s).          *Wellbutrin  mg/24 hours oral tablet, extended release: 300 mg, 1 tab(s), Oral, q 24 hrs, 0 Refill(s).          Kariva oral tablet: 1 tab(s), Oral, daily, skip last week of placebo and start new pack, 84 tab(s), 5 Refill(s).          NexIUM 40 mg oral delayed release capsule: 40 mg, 1 cap(s), Oral, daily, 90 cap(s), 3 Refill(s).          Zofran ODT 8 mg oral tablet, disintegratin mg, 1 tab(s), po, q8 hrs, PRN: for nausea/vomiting, 12 tab(s), 1 Refill(s).          Zoloft 100 mg oral tablet: 200 mg, 2 tab(s), Oral, daily, 180 tab(s), 2 Refill(s).          Nature-Throid 97.5 mg oral tablet: 97.5 mg, 1 tab(s), Oral, daily, 90 tab(s), 3 Refill(s).       Problem list:    All Problems  Hypothyroidism / SNOMED CT 107311936 / Confirmed  Chronic back pain / SNOMED CT 832081797 / Confirmed  Moderate major depression / SNOMED CT 8145899 / Confirmed  Peptic ulcer disease / SNOMED CT 11636140 / Confirmed  Resolved: Disseminated intravascular coagulation / SNOMED CT 873407361  Resolved: Inpatient stay / SNOMED CT 962490219  Resolved: Pregnancy / SNOMED CT 620380948      Histories   Past Medical  History:    Active  Moderate major depression (4844976)  Peptic ulcer disease (83570810)  Chronic back pain (406679999)  Resolved  Inpatient stay (861849405): Onset on 2019 at 39 years.  Resolved on 2019 at 39 years.  Comments:  7/10/2019 CDT 11:36 AM CDT - [facility] [place] [place] - Epigastric abdominal pain.  Disseminated intravascular coagulation (138376671):  Resolved.  Comments:  2019 CST 3:29 PM Michael Bonilla MD  following   Pregnancy (236706030):  Resolved on 2016 at 36 years.   Family History:    BCC - Basal cell carcinoma  Sister  Hypertension  Father  Liver damage  Grandfather (M)  CA - Cancer  Father  Comments:  2019 9:42 AM [facility] Garrido  Esophageal  Alcoholism  Father  Grandfather (M)  Depression  Father     Procedure history:     section (38625228) on 2016 at 36 Years.  Laparotomy (034508878) on 2016 at 36 Years.  Comments:  2019 3:28 PM Michael Bonilla MD  done for postpartum hemorrhage following    Social History:        Alcohol Assessment: Current            Wine (5 oz), 1-2 times per week      Tobacco Assessment: Past            Former smoker, quit more than 30 days ago                     Comments:                      2020 - Hayder Cid CMA                     quit age 19      Employment and Education Assessment            Work/School description: [employer].      Home and Environment Assessment            Marital status: .  Lives with Children, Spouse.  1 children.        Physical Examination   vital signs stable, as noted above   Vital Signs   2020 3:37 PM CDT Temperature Tympanic 98.2 DegF    Peripheral Pulse Rate 68 bpm    Systolic Blood Pressure 167 mmHg  HI    Diastolic Blood Pressure 80 mmHg    Mean Arterial Pressure 109 mmHg    Oxygen Saturation 98 %   2020 3:24 PM CDT Peripheral Pulse Rate 73 bpm    HR Method Electronic    Systolic Blood Pressure 166 mmHg   HI    Diastolic Blood Pressure 95 mmHg  HI    Mean Arterial Pressure 119 mmHg    BP Site Right arm    BP Method Electronic      Measurements from flowsheet : Measurements   9/17/2020 3:37 PM CDT Height Measured - Standard 65.5 in    Weight Measured - Standard 184 lb    BSA 1.96 m2    Body Mass Index 30.15 kg/m2  HI   9/17/2020 3:24 PM CDT Height Measured - Standard 65.5 in      General:  Alert and oriented, No acute distress.    Eye:  Extraocular movements are intact.    HENT:  Normocephalic, Tympanic membranes are clear, Oral mucosa is moist, No pharyngeal erythema.    Neck:  Supple, No carotid bruit, No jugular venous distention, No lymphadenopathy.    Respiratory:  Lungs are clear to auscultation, Respirations are non-labored, Breath sounds are equal.    Cardiovascular:  Normal rate, Regular rhythm, No murmur, No gallop, No edema.    Gastrointestinal:  Soft, Non-tender, Non-distended, Normal bowel sounds, No organomegaly.    Musculoskeletal:  Normal range of motion, Normal strength, No tenderness.    Neurologic:  Alert, Oriented, Normal motor function, No focal deficits.    Cognition and Speech:  Oriented, Speech clear and coherent.    Psychiatric:  Appropriate mood & affect.       Review / Management   Results review:  Lab results   9/17/2020 4:48 PM CDT WBC 6.3    RBC 4.46    Hgb 14.2 g/dL    Hct 41.3 %    MCV 93 fL    MCH 31.8 pg    MCHC 34.4 g/dL    RDW 12.5 %    Platelet 232    MPV 9.1 fL    Protime 12.8    INR 1.0   .       Impression and Plan   Diagnosis     Fatigue (KUX52-YN R53.83).     Lightheaded (MMQ47-CA R42).     Palpitation (PUY38-PT R00.2).       .) recurrent lightheadedness, palpitations - becoming more common in frequency.    Ddx: PSVT, symptomatic anemia, thyrotoxicosis, serotonin syndrome, anxiety  VSS, normal orthostatics.  Normal cardiac exam with normal regular rhythm.  Forego ECG - low suspicions for arrhythmia.  Could consider future Holter or event monitor  UPT: negative  CBC: normal,  Hgb 14.2, no evidence of recurrent iron deficiency or anemia  BMP, LFTs, INR - all normal - no issues with DIC, liver function  - general reassurance provided.  I do have suspicions of possible iatrogenic serotonin syndrome given high dose sertraline and bupropion.  Advised to continue monitoring symptoms, and if persistent, would encourage her to discuss with [facility] and consider potential dosage reduction or alternative class agent to see if symptoms reversible.        Professional Services   Counseling Summary:  This was a 25 minute visit with greater than 50% of that time spent counseling the patient, and review of previous GI and ANW documents.

## 2022-02-16 NOTE — NURSING NOTE
Comprehensive Intake Entered On:  3/30/2020 11:38 AM CDT    Performed On:  3/30/2020 11:38 AM CDT by Marycarmen Richard CMA               Summary   Chief Complaint :   Phone Visit: c/o cold night sweats for 6 weeks   Height Measured :   65.5 in(Converted to: 5 ft 5 in, 166.37 cm)    Marycarmen Richard CMA - 3/30/2020 11:38 AM CDT   Health Status   Allergies Verified? :   Yes   Medication History Verified? :   Yes   Medical History Verified? :   Yes   Pre-Visit Planning Status :   Not completed   Marycarmen Richard CMA - 3/30/2020 11:38 AM CDT   Consents   Consent for Immunization Exchange :   Consent Granted   Consent for Immunizations to Providers :   Consent Granted   Marycarmen Richard CMA - 3/30/2020 11:38 AM CDT   Meds / Allergies   (As Of: 3/30/2020 11:38:35 AM CDT)   Allergies (Active)   No Known Medication Allergies  Estimated Onset Date:   Unspecified ; Created By:   Shantelle Young CMA; Reaction Status:   Active ; Category:   Drug ; Substance:   No Known Medication Allergies ; Type:   Allergy ; Updated By:   Shantelle Young CMA; Reviewed Date:   3/30/2020 11:38 AM CDT        Medication List   (As Of: 3/30/2020 11:38:35 AM CDT)   Prescription/Discharge Order    moxifloxacin ophthalmic  :   moxifloxacin ophthalmic ; Status:   Completed ; Ordered As Mnemonic:   moxifloxacin 0.5% ophthalmic solution ; Simple Display Line:   1 drop(s), Eye-Right, bid, Follow-up in clinic if no improvement in 36 hours or sooner if worse, 3 mL, 0 Refill(s) ; Ordering Provider:   Lemuel Concepcion PA-C; Catalog Code:   moxifloxacin ophthalmic ; Order Dt/Tm:   2/17/2020 1:47:30 PM CST          sertraline  :   sertraline ; Status:   Prescribed ; Ordered As Mnemonic:   Zoloft 100 mg oral tablet ; Simple Display Line:   200 mg, 2 tab(s), Oral, daily, 180 tab(s), 2 Refill(s) ; Ordering Provider:   Michael Ro MD; Catalog Code:   sertraline ; Order Dt/Tm:   2/24/2020 1:11:44 PM CST          desogestrel-ethinyl estradiol  :   desogestrel-ethinyl estradiol ;  Status:   Prescribed ; Ordered As Mnemonic:   Kariva oral tablet ; Simple Display Line:   1 tab(s), Oral, daily, skip last week of placebo and start new pack, 84 tab(s), 5 Refill(s) ; Ordering Provider:   Michael Ro MD; Catalog Code:   desogestrel-ethinyl estradiol ; Order Dt/Tm:   12/26/2019 1:11:49 PM CST          esomeprazole  :   esomeprazole ; Status:   Prescribed ; Ordered As Mnemonic:   NexIUM 40 mg oral delayed release capsule ; Simple Display Line:   40 mg, 1 cap(s), Oral, daily, 90 cap(s), 3 Refill(s) ; Ordering Provider:   Michael Ro MD; Catalog Code:   esomeprazole ; Order Dt/Tm:   10/22/2019 2:52:39 PM CDT          thyroid desiccated  :   thyroid desiccated ; Status:   Prescribed ; Ordered As Mnemonic:   Nature-Throid 97.5 mg oral tablet ; Simple Display Line:   97.5 mg, 1 tab(s), Oral, daily, 90 tab(s), 3 Refill(s) ; Ordering Provider:   Michael Ro MD; Catalog Code:   thyroid desiccated ; Order Dt/Tm:   10/11/2019 10:11:31 AM CDT            Home Meds    buPROPion  :   buPROPion ; Status:   Documented ; Ordered As Mnemonic:   Wellbutrin  mg/24 hours oral tablet, extended release ; Simple Display Line:   300 mg, 1 tab(s), Oral, q 24 hrs, 0 Refill(s) ; Catalog Code:   buPROPion ; Order Dt/Tm:   2/7/2019 2:42:51 PM CST

## 2022-02-16 NOTE — TELEPHONE ENCOUNTER
---------------------  From: Shantelle Young CMA (Phone Messages Pool ([id number]_[facility]))   To: Lemuel Concepcion PA-C;     Sent: 2/17/2020 1:17:02 PM CST  Subject: CONSUMER MESSAGE : Conjunctivitis     Patient seen [facility] on 2/10/2020 for conjunctivitis, note is attached please advise.     Shantelle TAYLOR CMA       ---------------------  From: BELKIS SWIFT  To: [facility]  Sent: 02/17/2020 12:04 p.m. CST  Subject: Conjunctivitis  Hi Dr. Ro,  I completed the eye drops but last night my eye got all gooey again. A little swollen, too. Should I continue with more drops?    Belkis---------------------  From: Lemuel Concepcion PA-C   To: Phone Messages [employer] ([id number]_[facility]);     Sent: 2/17/2020 1:18:15 PM CST  Subject: RE: CONSUMER MESSAGE : Conjunctivitis     Refill drops x one. Exam if not better in 36 hours or prior if worse.    Eve Call    Time: 1:45pm    Note: called and LM to let patient  know that drops would be refilled to [facility]. and to follow-up in 36hours if no improvement or sooner

## 2022-02-16 NOTE — TELEPHONE ENCOUNTER
Entered by Michael Ro MD on November 27, 2020 9:44:53 AM CST  ---------------------  From: Michael Ro MD   To: [facility]    Sent: 11/27/2020 9:44:53 AM CST  Subject: Medication Management     ** Submitted: **  Complete:desogestrel-ethinyl estradiol (Kariva oral tablet)   Signed by Michael Ro MD  11/27/2020 3:44:00 PM [facility]    ** Approved with modifications: **  desogestrel-ethinyl estradiol (VOLNEA TABS VISHNU 28'S)  TAKE 1 TABLET DAILY , SKIP LAST WEEK OF PLACEBO AND START NEW PACK  Qty:  84 tab(s)        Days Supply:  0        Refills:  1          Substitutions Allowed     Route To Pharmacy - [facility]   Note to Pharmacy:  due for annual exam              ** Patient matched by Michael Ro MD on 11/27/2020 9:43:33 AM CST **      ------------------------------------------  From: [facility]  To: Michael Ro MD  Sent: November 26, 2020 1:02:48 AM CST  Subject: Medication Management  Due: November 26, 2020 5:03:39 PM CST     ** On Hold Pending Signature **     Dispensed Drug: desogestrel-ethinyl estradiol (Volnea oral tablet), TAKE 1 TABLET DAILY , SKIP LAST WEEK OF PLACEBO AND START NEW PACK  Quantity: 84 tab(s)  Days Supply: 0  Refills: 4  Substitutions Allowed  Notes from Pharmacy:  ------------------------------------------

## 2022-02-16 NOTE — TELEPHONE ENCOUNTER
---------------------  From: Prisca Leslie MA (Phone Messages Pool ([id number]_[facility]))   To: BELKIS SWIFT    Sent: 3/11/2019 8:11:58 AM CDT  Subject: RE: Thyroid tests?     Dante Tamayo,   The results are back and were sent to Dr. Doc Ignacio. It looks like he is the provider that sent us the order for them.       ---------------------  From: BELKIS SWIFT  To: [facility]  Sent: 03/11/2019 08:04 a.m. CDT  Subject: Thyroid tests?  I see my thyroid results haven t come back yet.  Blood was taken last Tuesday.  Any idea when the results will be available?    Belkis

## 2022-02-16 NOTE — TELEPHONE ENCOUNTER
---------------------  From: Tea Mcpherson LPN (Phone Messages Pool ([id number]_[place]))   To: MAXIMILIAN SWIFT    Sent: 7/9/2019 10:45:23 AM CDT  Subject: FW: Radiology Report and Imaging     Buster Tamayo,  If you need report and imaging you will have contact the [facility].  If you go to there radiology department there you will need a photo ID and sign a release to get them.  I hope this helps.   Tea Kaur       ---------------------  From: MAXIMILIAN SWIFT  To: [facility]  Sent: 07/09/2019 10:12 a.m. CDT  Subject: Radiology Report and Imaging  Dante Ro,  How do I obtain a copy of the radiology report from my ct in [place] and can I get the images?  Mustafa always had this info on the Portal but I can t find it on [facility]

## 2022-02-16 NOTE — NURSING NOTE
Depression Screening Entered On:  2/7/2019 3:26 PM CST    Performed On:  2/7/2019 3:26 PM CST by Shantelle Young CMA               Depression Screening   Feeling Down, Depressed, Hopeless :   Not at all   Little Interest - Pleasure in Activities :   Not at all   Initial Depression Screen Score :   0    Trouble Falling or Staying Asleep :   Not at all   Feeling Tired or Little Energy :   Not at all   Poor Appetite or Overeating :   Not at all   Feeling Bad About Yourself :   Not at all   Trouble Concentrating :   Not at all   Moving or Speaking Slowly :   Not at all   Thoughts Better Off Dead or Hurting Self :   Not at all   Detailed Depression Screen Score :   0    Total Depression Screen Score :   0    Shantelle Young CMA - 2/7/2019 3:26 PM CST

## 2022-02-16 NOTE — PROGRESS NOTES
"   Patient:   MAXIMILIAN SWIFT            MRN: [id number]            FIN: [id number]               Age:   39 years     Sex:  Female     :  1980   Associated Diagnoses:   Fatigue; Anemia   Author:   Michael Ro MD      Chief Complaint       2019 3:50 PM CDT review labs   2019 10:48 AM CDT c/o stiff neck, tired, headache, \"can't think right\"; went home from work yesterday; new dx of iron deficiency, was in [facility] at the end of  for transfusions         History of Present Illness   patient here for follow up labs  labs were normal  feeling a little better today but still very fatigued, falling asleep even when driving         Health Status   Allergies:    Allergic Reactions (All)  No Known Medication Allergies   Medications:  (Selected)   Prescriptions  Prescribed  Nature-Throid 97.5 mg oral tablet: = 1 tab(s) ( 97.5 mg ), Oral, daily, # 90 tab(s), 3 Refill(s), Type: Maintenance, Pharmacy: [id number], 1 tab(s) Oral daily  Documented Medications  Documented  Kariva oral tablet: 1 tab(s), Oral, daily, 0 Refill(s), Type: Maintenance  NexIUM: ( 40 mg ), Oral, daily, 0 Refill(s), Type: Maintenance  Wellbutrin  mg/24 hours oral tablet, extended release: = 1 tab(s) ( 300 mg ), Oral, q 24 hrs, 0 Refill(s), Type: Maintenance  Zoloft 100 mg oral tablet: = 2 tab(s) ( 200 mg ), Oral, daily, 0 Refill(s), Type: Maintenance   Problem list:    All Problems (Selected)  Chronic back pain / SNOMED CT 741305253 / Confirmed  Moderate major depression / SNOMED CT 0974711 / Confirmed  Peptic ulcer disease / SNOMED CT 12246894 / Confirmed      Histories   Family History:    BCC - Basal cell carcinoma  Sister  Hypertension  Father  Liver damage  Grandfather (M)  CA - Cancer  Father  Comments:  2019 9:42 AM CST - Tom , Josefina  Esophageal  Alcoholism  Father  Grandfather (M)  Depression  Father     Procedure history:     section (81282609) on 2016 at 36 Years.  Laparotomy ([id number]) on " 2016 at 36 Years.  Comments:  2019 3:28 PM JASMIN - Jayjay BAÑUELOS, Michael  done for postpartum hemorrhage following       Physical Examination   Vital Signs   2019 3:50 PM CDT Peripheral Pulse Rate 70 bpm    Systolic Blood Pressure 110 mmHg    Diastolic Blood Pressure 60 mmHg    Mean Arterial Pressure 77 mmHg    Oxygen Saturation 98 %   2019 10:48 AM CDT Temperature Temporal 97.8 DegF    Peripheral Pulse Rate 60 bpm    Pulse Site Radial artery    HR Method Manual    Systolic Blood Pressure 114 mmHg    Diastolic Blood Pressure 66 mmHg    Mean Arterial Pressure 82 mmHg    BP Site Right arm    BP Method Manual      Measurements from flowsheet : Measurements   2019 3:50 PM CDT Height Measured - Standard 65.5 in    Weight Measured - Standard 170.0 lb    BSA 1.89 m2    Body Mass Index 27.86 kg/m2  HI   2019 10:48 AM CDT Height Measured - Standard 65.5 in    Weight Measured - Standard 170 lb    BSA 1.89 m2    Body Mass Index 27.86 kg/m2  HI      General:  Alert and oriented, No acute distress.       Impression and Plan   Diagnosis     Fatigue (OBI44-VJ R53.83).     Anemia (CXN68-JI D64.9).     Plan:  labs are normal, pill cam scheduled for Monday, consider hematology consult if pillcam is normal,  could also consider sleep disorder testing.

## 2022-02-16 NOTE — LETTER
(Inserted Image. Unable to display)                                                                                                [address] [facility] [address]                                                August 06, 2019Re: MAXIMILIAN SWIFT1980 [place] [facility] [address] [facility] [address] [place] [id number]:  [place] GastroThe following patient has been referred to your office/practice:  MAXIMILIAN WSIFT Appointment:  Appointment is PendingLocation:  [facility] refer to the attached  clinical documentation for a summary of MAXIMILIAN's care.  Please do not hesitate to contact our office if any additional clinical questions arise.  All relevant records and transition of care documents should be mailed or faxed.Your assistance in providing continuity of care is appreciated. Sincerely, [facility] [place] [place] [address](P) [phone](F) [phone]

## 2022-02-16 NOTE — TELEPHONE ENCOUNTER
---------------------  From: BELKIS SWIFT  To: [facility]  Sent: 10/16/2020 12:21 p.m. CDT  Subject: Fluid accumulation  Can I try a diuretic to get rid of this fluid?  It?s uncomfortable.---------------------  From: Rebeca Rosario CMA (Phone Messages Pool ([id number]))   To: Jas Palacios MD;     Sent: 10/16/2020 12:32:56 PM CDT  Subject: FW: Fluid accumulation---------------------  From: Jas Palacios MD   To: Phone Messages Pool ([id number]);     Sent: 10/16/2020 1:05:27 PM CDT  Subject: RE: Fluid accumulation     can wait for [facility] return---------------------  From: Rebeca Rosario CMA (Phone Messages Pool ([id number]))   To: [facility] Message Pool ([id number]);     Sent: 10/16/2020 3:08:27 PM CDT  Subject: FW: Fluid accumulationGood morning Belkis,    Please schedule an appointment with Dr. Ro to discuss further. We can work you in to our schedule. I have copied the schedulers on this message and they will be reaching out to you to get scheduled with her.    Thanks    Stefanie---------------------  From: Stefanie Dubose CMA ([facility] Message Pool ([id number]))   To: BELKIS SWIFT    Cc: Appointment Pool ([id number]);      Sent: 10/19/2020 7:32:02 AM CDT  Subject: RE: Fluid accumulationleft message for pt to call back to set up apt - ok to work into [facility] schedule per [facility] CSS Stefanie---------------------  From: Sachin , April (Appointment Pool ([id number]))   To: [facility] Message Pool ([id number]_[place]);     Sent: 10/21/2020 8:14:15 AM CDT  Subject: RE: Fluid accumulation     2nd attempt / left 2nd vm2nd attempt  sent back to provider pool

## 2022-02-16 NOTE — TELEPHONE ENCOUNTER
---------------------  [facility]   To: BELKIS SWIFT SHWETHA    Sent: 4/1/2020 8:57:57 AM CDT  Subject: Patient Message - Results Notification     Belkis,  These results are all normal. No explanation of your symptoms. Please let me know how you are doing.  Thanks,  Fiordaliza Ro    Results:  Date Result Name Value Ref Range   3/31/2020 8:33 AM TSH 1.33 mIU/L    3/31/2020 8:33 AM WBC 5.6 (3.8 - 10.8)   3/31/2020 8:33 AM RBC 4.16 (3.80 - 5.10)   3/31/2020 8:33 AM Hgb 13.3 gm/dL (11.7 - 15.5)   3/31/2020 8:33 AM Hct 38.4 % (35.0 - 45.0)   3/31/2020 8:33 AM MCV 92.3 fL (80.0 - 100.0)   3/31/2020 8:33 AM MCH 32.0 pg (27.0 - 33.0)   3/31/2020 8:33 AM MCHC 34.6 gm/dL (32.0 - 36.0)   3/31/2020 8:33 AM RDW 12.6 % (11.0 - 15.0)   3/31/2020 8:33 AM Platelet 200 (140 - 400)   3/31/2020 8:33 AM MPV 10.0 fL (7.5 - 12.5)

## 2022-02-16 NOTE — PROGRESS NOTES
Chief Complaint    Eye Irritation; right eye was mattery;  History of Present Illness      patient with 2 days of progressively worsening right eye irritation and drainage      mattery draining, eyelashes stuck together overnight      [place] barn [place] Saturday but does not remember getting anything in the eye      has not otherwise been ill, not congested or coughing  Physical Exam   Vitals & Measurements    HR: 60(Peripheral)  BP: 102/60  SpO2: 96%     HT: 65.5 in  WT: 176.2 lb  BMI: 28.87       patient is alert and oriented, cooperative, in no distress      eyes: PERRL, EOM intact, funduscopic exam right eye normal, no foreign body seen, eyelid is mildly swollen and conjunctiva is injected, left eye normal      heent: normocephalic, oral mucosa moist         Assessment/Plan       1. Acute purulent conjunctivitis of right eye (H10.021)         treat with eye drops as ordered, wash hands frequently, follow up if not getting better        OK to treat left eye if symptoms start in left eye        if refills of drops needed, can call for prescription       Orders:         moxifloxacin ophthalmic, 1 drop(s), Eye-Right, bid, # 3 mL, 0 Refill(s), Type: Acute, Pharmacy: [facility] , 1 drop(s) Eye-Right bid, (Ordered)  Patient Information     Name:MAXIMILIAN SWIFT      Address:      [address] [id number]     Sex:Female     YOB: 1980     [phone]     Emergency Contact:LUANN MONACO     MRN:[id number]     FIN:[id number]     Location:[facility]     Date of Service:02/10/2020      Primary Care Physician:       Michael Ro MD, [phone]      Attending Physician:       Michael Ro MD, [phone]  Problem List/Past Medical History    Ongoing     Chronic back pain     Moderate major depression     Peptic ulcer disease    Historical     Disseminated intravascular coagulation       Comments: following       Inpatient stay       Comments: [facility], [place] - Epigastric abdominal pain.     Pregnancy  Procedure/Surgical History      section (2016)     Laparotomy (2016)      Comments: done for postpartum hemorrhage following .  Medications    Kariva oral tablet, 1 tab(s), Oral, daily, 5 refills    moxifloxacin 0.5% ophthalmic solution, 1 drop(s), Eye-Right, bid    Nature-Throid 97.5 mg oral tablet, 97.5 mg= 1 tab(s), Oral, daily, 3 refills    NexIUM 40 mg oral delayed release capsule, 40 mg= 1 cap(s), Oral, daily, 3 refills    Wellbutrin  mg/24 hours oral tablet, extended release, 300 mg= 1 tab(s), Oral, q 24 hrs    Zoloft 100 mg oral tablet, 200 mg= 2 tab(s), Oral, daily  Allergies    No Known Medication Allergies  Social History    Smoking Status - 2019     Former smoker     Alcohol - Current, 2019      Wine (5 oz), 1-2 times per week, 2019     Employment/School      Work/School description: [employer] 2019     Home/Environment      Marital status: . Lives with Children, Spouse. 1 children., 2019     Tobacco - Past, 2019  Family History    Alcoholism: Father and Grandfather (M).    BCC - Basal cell carcinoma: Sister.    CA - Cancer: Father.    Depression: Father.    Hypertension: Father.    Liver damage: Grandfather (M).

## 2022-02-16 NOTE — NURSING NOTE
Comprehensive Intake Entered On:  2/7/2019 2:46 PM CST    Performed On:  2/7/2019 2:41 PM CST by Shantelle Young CMA               Summary   Chief Complaint :   New patient; Establish care   Weight Measured :   182.2 lb(Converted to: 182 lb 3 oz, 82.64 kg)    Height Measured :   65.5 in(Converted to: 5 ft 5 in, 166.37 cm)    Body Mass Index :   29.86 kg/m2 (HI)    Body Surface Area :   1.95 m2   Systolic Blood Pressure :   80 mmHg (LOW)    Diastolic Blood Pressure :   62 mmHg   Mean Arterial Pressure :   68 mmHg   Peripheral Pulse Rate :   68 bpm   Pulse Site :   Brachial artery   HR Method :   Manual   Temperature Tympanic :   98.7 DegF(Converted to: 37.1 DegC)    Shantelle Young CMA - 2/7/2019 2:41 PM CST   Health Status   Allergies Verified? :   Yes   Medication History Verified? :   Yes   Medical History Verified? :   Yes   Pre-Visit Planning Status :   Completed   Tobacco Use? :   Never smoker   Shantelle Young CMA - 2/7/2019 2:41 PM CST   Consents   Consent for Immunization Exchange :   Consent Granted   Consent for Immunizations to Providers :   Consent Granted   Shantelle Young CMA - 2/7/2019 2:41 PM CST   Meds / Allergies   (As Of: 2/7/2019 2:46:30 PM CST)   Allergies (Active)   No Known Medication Allergies  Estimated Onset Date:   Unspecified ; Created By:   Shantelle Young CMA; Reaction Status:   Active ; Category:   Drug ; Substance:   No Known Medication Allergies ; Type:   Allergy ; Updated By:   Shantelle Young CMA; Reviewed Date:   2/7/2019 2:41 PM CST        Medication List   (As Of: 2/7/2019 2:46:30 PM CST)   Home Meds    buPROPion  :   buPROPion ; Status:   Documented ; Ordered As Mnemonic:   Wellbutrin  mg/24 hours oral tablet, extended release ; Simple Display Line:   300 mg, 1 tab(s), Oral, q 24 hrs, 0 Refill(s) ; Catalog Code:   buPROPion ; Order Dt/Tm:   2/7/2019 2:42:51 PM          desogestrel-ethinyl estradiol  :   desogestrel-ethinyl estradiol ; Status:   Documented ; Ordered As Mnemonic:    Kariva oral tablet ; Simple Display Line:   1 tab(s), Oral, daily, 0 Refill(s) ; Catalog Code:   desogestrel-ethinyl estradiol ; Order Dt/Tm:   2/7/2019 2:43:10 PM          esomeprazole  :   esomeprazole ; Status:   Documented ; Ordered As Mnemonic:   NexIUM ; Simple Display Line:   40 mg, Oral, daily, 0 Refill(s) ; Catalog Code:   esomeprazole ; Order Dt/Tm:   2/7/2019 2:42:39 PM          sertraline  :   sertraline ; Status:   Documented ; Ordered As Mnemonic:   Zoloft 100 mg oral tablet ; Simple Display Line:   200 mg, 2 tab(s), Oral, daily, 0 Refill(s) ; Catalog Code:   sertraline ; Order Dt/Tm:   2/7/2019 2:42:28 PM

## 2022-02-16 NOTE — TELEPHONE ENCOUNTER
Entered by Stefanie Dubose CMA on October 21, 2020 10:05:05 AM CDT  Do you have other input? I advised a visit to discuss diuretic/edema.     ---------------------  From: BELKIS SWIFT  To: [email or web address] Message Pool ([id number]_[facility])  Sent: 10/21/2020 10:01 a.m. CDT  Subject: RE: Fluid accumulation  The shortness of breath has been constant for a month or more.  There is a tightness in my throat. I went to [place] and got a Covid test. It was negative. I think I have an old inhaler I ll try. Apple Watch says O2 is fine. The bloating/ fluid comes and goes. It is what it is I guess. I ll just deal with it.       Addendum by Stefanie Dubose CMA on October 19, 2020 7:32:02 AM CDT  ---------------------  From: Stefanie Dubose CMA ([email or web address] Message Pool ([id number]_[facility]))  To: BELKIS SWIFT  Cc: Appointment Pool ([id number]);  Sent: 10/19/2020 7:32:02 AM CDT  Subject: RE: Fluid accumulation       Addendum by Stefanie Dubose CMA on October 19, 2020 7:31:50 AM CDT  Good morning Belkis,       Please schedule an appointment with Dr. Ro to discuss further. We can work you in to our schedule. I have copied the schedulers on this message and they will be reaching out to you to get scheduled with her.       Thanks       Stefanie       Addendum by Rebeca Rosario CMA on October 16, 2020 3:08:27 PM CDT  ---------------------  From: Rebeca Rosario CMA (Phone Messages Pool (24924_[facility]))  To: [email or web address] Message Pool ([id number]);  Sent: 10/16/2020 3:08:27 PM CDT  Subject: FW: Fluid accumulation       Addendum by Jas Palacios MD on October 16, 2020 1:05:27 PM CDT  ---------------------  From: Jas Palacios MD  To: Phone Messages Pool ([id number]_[facility]);  Sent: 10/16/2020 1:05:27 PM CDT  Subject: RE: Fluid accumulation       can wait for [facility] return       Addendum by Rebeca Rosario CMA on October 16, 2020 12:32:56 PM CDT  ---------------------  From: Rebeca Rosario CMA (Phone Messages Pool (36052_[facility]))  To: Jas Palacios MD;  Sent: 10/16/2020 12:32:56 PM CDT  Subject: FW: Fluid accumulation  ---------------------  From: MAXIMILIAN SWIFT  To: [facility]  Sent: 10/16/2020 12:21 p.m. CDT  Subject: Fluid accumulation  Can I try a diuretic to get rid of this fluid?  It s uncomfortable.---------------------  From: Stefanie Dubose CMA ([employer] Pool (32224_[facility]))   To: Michael Ro MD;     Sent: 10/21/2020 10:05:09 AM CDT  Subject: FW: Fluid accumulationKari,  Diuretics can cause electrolyte abnormalities and low blood pressure, so should not be started without further evaluation. I think it you are having ongoing symptoms and concerns we should have a visit in person to review symptoms, do an exam, recheck labs and vital signs, and come up with a plan.   The other consideration is that the change in thyroid medication could be contributing to a change in symptoms and fluid retention as well.  I think an appointment would be best. I'm out the rest of this week but happy to get you in next week Monday through Thursday - let us know.  Fiordaliza Ro---------------------  From: Michael Ro MD   To: [employer] (32224_[facility]); MAXIMILIAN SWIFT    Sent: 10/21/2020 10:18:46 AM CDT  Subject: RE: Fluid accumulation

## 2022-02-16 NOTE — PROGRESS NOTES
Patient:   MAXIMILIAN SWIFT            MRN: [id number]            FIN: [id number]               Age:   39 years     Sex:  Female     :  1980   Associated Diagnoses:   Iron deficiency anemia; GI bleed; Chronic pain of right thumb   Author:   Michael Ro MD      Chief Complaint   2019 1:20 PM CDT     pt here to f/u hospital stay, pt is getting an infusion and ould like hgb checked      History of Present Illness   1. patient here to follow up recent hospitalization at [facility], patient with onset of pain, swelling of unclear etiology, subcutaneous swelling resolved without intervention, evidence of GI bleeding with drop in hgb to 8, but EGD was normal. Plan is for outpatient GI follow up for colonoscopy and possible capsule study. Overall slowly feeling better. No recurrence of swelling. Fatigue is improving. Is known to be iron deficient. Needs to be scheduled for 3 more venofer infusions.  2. patient with pain at the base of right thumb, slowly worsening, improved after oral prednisone, wondering about injection      Health Status   Allergies:    Allergic Reactions (All)  No Known Medication Allergies   Medications:  (Selected)   Prescriptions  Prescribed  Nature-Throid 97.5 mg oral tablet: = 1 tab(s) ( 97.5 mg ), Oral, daily, # 90 tab(s), 3 Refill(s), Type: Maintenance, Pharmacy: [id number], 1 tab(s) Oral daily  Documented Medications  Documented  Kariva oral tablet: 1 tab(s), Oral, daily, 0 Refill(s), Type: Maintenance  NexIUM: ( 40 mg ), Oral, daily, 0 Refill(s), Type: Maintenance  Wellbutrin  mg/24 hours oral tablet, extended release: = 1 tab(s) ( 300 mg ), Oral, q 24 hrs, 0 Refill(s), Type: Maintenance  Zoloft 100 mg oral tablet: = 2 tab(s) ( 200 mg ), Oral, daily, 0 Refill(s), Type: Maintenance   Problem list:    All Problems (Selected)  Chronic back pain / SNOMED CT 957216152 / Confirmed  Moderate major depression / SNOMED CT 8115996 / Confirmed  Peptic ulcer disease / SNOMED  CT 22756517 / Confirmed      Histories   Past Medical History:    Active  Moderate major depression (SNOMED CT 1453863)  Peptic ulcer disease (SNOMED CT 42022296)  Chronic back pain (SNOMED CT 959465746)  Resolved  Disseminated intravascular coagulation (SNOMED CT 531354915):  Resolved.  Comments:  2019 CST 3:29 PM CST Ros Ro MD, Michael  following   Pregnancy (SNOMED CT 169787739):  Resolved on 2016 at 36 years.   Family History:    BCC - Basal cell carcinoma  Sister  Hypertension  Father  Liver damage  Grandfather (M)  CA - Cancer  Father  Comments:  2019 9:42 AM CST - Tom Josefina  Esophageal  Alcoholism  Father  Grandfather (M)  Depression  Father     Procedure history:     section (53448974) on 2016 at 36 Years.  Laparotomy (934643852) on 2016 at 36 Years.  Comments:  2019 3:28 PM Michael Bonilla MD  done for postpartum hemorrhage following    Social History:        Alcohol Assessment: Current            Wine (5 oz), 1-2 times per week      Tobacco Assessment: Past      Employment and Education Assessment            Work/School description: [employer].      Home and Environment Assessment            Marital status: .  Lives with Children, Spouse.  1 children.      Physical Examination   Vital Signs   2019 1:20 PM CDT Temperature Tympanic 97.5 DegF  LOW    Peripheral Pulse Rate 63 bpm    Pulse Site Radial artery    HR Method Manual    Systolic Blood Pressure 124 mmHg    Diastolic Blood Pressure 72 mmHg    Mean Arterial Pressure 89 mmHg    BP Site Right arm    BP Method Manual      Measurements from flowsheet : Measurements   2019 1:20 PM CDT Height Measured - Standard 65.5 in    Weight Measured - Standard 172 lb    BSA 1.9 m2    Body Mass Index 28.18 kg/m2  HI      General:  Alert and oriented, No acute distress.    Eye:  Pupils are equal, round and reactive to light, Normal conjunctiva.    HENT:  Normocephalic, Oral mucosa is moist, No  pharyngeal erythema.    Neck:  Supple, Non-tender, No lymphadenopathy.    Respiratory:  Lungs are clear to auscultation.    Cardiovascular:  Normal rate, Regular rhythm.    Musculoskeletal:  pain and mild swelling at base of right thumb, no redness or increased warmth.       Review / Management   Results review:  Lab results   7/1/2019 1:47 PM CDT Hgb 10.3 g/dL    MCV 80 fL   .       Impression and Plan   Diagnosis     Iron deficiency anemia (GKO58-HF D50.9).     GI bleed (SYC14-VX K92.2).     Course:  hgb improving, will ask referral office to confirm [facility] follow up. Contacted infusion center and referral for venofer infusions faxed to them. They will contact patient to schedule once insurance coverage is confirmed. Patient notes if GI workup negative, will want her to see hematology..    Orders     Orders (Selected)   Prescriptions  Prescribed  Venofer 20 mg/mL intravenous solution: = 10 mL ( 200 mg ), IV, 3x/wk, Instructions: for 3 doses, # 3 EA, 0 Refill(s), Type: Acute.     Diagnosis     Chronic pain of right thumb (QCO15-EQ M79.644).     Course:  given recent hospitalization, injection is more likely to be tolerated than oral treatment. Referral placed..    Orders     Orders (Selected)   Outpatient Orders  Completed  Referral (Request): 07/01/19 14:04:00 CDT, Referred to: Orthopaedics, Referred to: hand surgeon, Reason for referral: thumb pain, interested in injection, Chronic pain of right thumb.

## 2022-02-16 NOTE — TELEPHONE ENCOUNTER
---------------------  From: Prisca Leslie MA (Phone Messages Pool ([address]224_[place]))   To: Michael Ro MD;     Sent: 8/5/2019 8:21:27 AM CDT  Subject: Phone Note: Referral      PCP:   GALEN     Time of Call:  8:15am       Person Calling:  Belkis  Phone number:  [phone]    Returned call at: _    Note:   Patient called stating she is going in to have a pill cam endoscopy done this morning and [facility] in [place]. States she received a email from her insurance that they need a referral from her primary provider to have this go through insurance. Fax is [phone]    Pharmacy: n/a    Last office visit and reason:  8/1/19    Transferred to: GALEN  ** Submitted: **  Order:Referral (Request)  Details:  8/5/2019 9:08 AM CDT, Referred to: Gastroenterology, Referred to: MICHAEL, Reason for referral: needs ongoing eval and care for hospitalization from GI bleed/anemia including pillcam, GI bleed  Anemia         Signed by Michael Ro MD  8/5/2019 9:08:00 AM---------------------  From: Michael Ro MD   To: Referral Coordinators Pool (32224_[facility]); Phone Messages Pool ([address]224_[place]);     Sent: 8/5/2019 9:09:12 AM CDT  Subject: FW: Phone Note: ReferralFaxed.

## 2022-02-16 NOTE — TELEPHONE ENCOUNTER
---------------------  From: Bonnie Sam   To: MAXIMILIAN SWIFT    Sent: 8/13/2019 4:01:23 PM CDT  Subject: General Message     Dr. Bhaskar Tamayo put in the order and I brought it to [facility], they will contact you to schedule. Thank you  -Bonnie      ---------------------  From: MAXIMILIAN SWIFT  To: [facility]  Sent: 08/12/2019 04:32 p.m. CDT  Subject: CT Enterography Referall  I think I have part of it straightened out.    Can you send a referral to [place] for the CT Enterography?

## 2022-02-16 NOTE — TELEPHONE ENCOUNTER
---------------------  From: Nathalie Szymanski (Phone Messages Pool 32224_[facility]))   To: Michael Ro MD;     Sent: 6/27/2019 3:11:52 PM CDT  Subject: FW: Thyroid meds     Med only documented.         ---------------------  From: MAXIMILIAN SWIFT  To: [facility]  Sent: 06/27/2019 11:27 a.m. CDT  Subject: Thyroid meds  One other thing, can I get a script from you for my Nature Throid 97.5mg so I don t have to pay out of pocket for it?    Giovani separate message

## 2022-02-16 NOTE — TELEPHONE ENCOUNTER
---------------------  From: Magdalena Monterroso LPN (Phone Messages Pool ([address]224_[place]))   To: [employer] Message Pool (32224_[facility]); BELKIS SWIFT    Sent: 9/18/2020 8:04:03 AM CDT  Subject: FW: Dr. Jayjay Tamayo,    Dr. Ro has been out of clinic this week. I can forward your message to her for Monday when she is back in clinic. I would recommend following up if you are still concerned to discuss further.     Thanks,  Magdalena SHETH LPN      ---------------------  From: BELKIS SWIFT  To: [facility]  Sent: 09/18/2020 07:58 a.m. CDT  Subject: Bp  My bp and heart rate were normal this morning 120/80. Pulse 56    A patient very similar to me was admitted at [facility] for further diagnostics.    Belkis          ---------------------  From: BELKIS SWIFT  To: [facility]  Sent: 09/17/2020 09:51 p.m. CDT  Subject: Dr. Jayjay Goins Dr.,    I stopped into the clinic to get my bp checked and it was 167/80.  I was feeling light headed and my heart was pounding out of my chest.  I got pretty concerned about it while standing at the check-in desk that I mentioned it to the woman behind the desk.  I saw Dr. Palacios who said my vitals were within normal range.  They were not, not for me.  My resting hr is 60, 75 is elevated for me.  I didn t feel right.  The labs came back fine.  He suggested it is the sertraline and Wellbutrin that is causing my light headedness.  I don t believe this.  I ve been on them for quite awhile.  It doesn t explain the high blood pressure I ve had all week.  I m a little frustrated right now, and concerned.    Belkis---------------------  From: Ava Lin ([facility] Message Pool (32224_[facility]))   To: Jayjay BAÑUELOS [facility];     Sent: 9/21/2020 7:59:16 AM CDT  Subject: FW: Dr. Willis,  I'm happy to see you in clinic or if you are concerned that you need to be hospitalized at any point, an evaluation in the ER is reasonable. Let me  know how things are going this week and we can figure out next steps.  Fiordaliza Ro---------------------  From: Michael Ro MD   To: [employer] Pool (32224_[facility]); MAXIMILIAN SWIFT    Sent: 9/21/2020 11:49:31 AM CDT  Subject: RE: Dr. Ro---------------------  From: Kinza MITCHELL, Maye WORTHY ([employer] Message Pool (32224_[facility]))   To: MAXIMILIAN SWIFT    Sent: 9/21/2020 1:38:38 PM CDT  Subject: FW: Dr. Ro

## 2022-02-16 NOTE — TELEPHONE ENCOUNTER
---------------------  From: Nathalie Szymanski (Phone Messages Pool ([id number]_[facility]))   To: Michael Ro MD;     Sent: 6/27/2019 3:11:21 PM CDT  Subject: FW: GI Referral to [facility]           ---------------------  From: BELKIS SWIFT  To: [facility]  Sent: 06/27/2019 11:25 a.m. CDT  Subject: GI Referral to [facility]  Dante Ro,    GI from [facility] is going to call me to set up an appointment with them. I talked to Shilpa and they said as long as I get a referral from you I can go there.  They want to do a colonoscopy and possibly a pill cam to look for a GI bleed.  If that doesn t reveal anything then it s on to hematology. Someone did contact me about the iron infusions that they are waiting for someone from your team to get back to them.    For Mini[facility] and she can have nitrous with a letter? authorization? from you.  She ll also need a preop 30 days before.  Can you just confirm that I have this correct?    Sorry this is so much.  I hope I m getting it all straight.    Belkis  ** Submitted: **  Order:Referral (Request)  Details:  7/1/2019 7:58 AM CDT, Referred to: Gastroenterology, Referred to: [facility] - following after hospitalization; patient reports they are contacting her to schedule, Iron deficiency anemia  GI bleed         Signed by Michael Ro MD  7/1/2019 7:58:00 AM    ** Submitted: **  Order:thyroid desiccated (Nature-Throid 97.5 mg oral tablet)  1 tab(s)  Oral  daily  Qty:  90 tab(s)        Refills:  3          Substitutions Allowed     Route To Pharmacy - [id number]    Signed by Michael oR MD  7/1/2019 7:58:00 AM    ** Documented **  Discontinue:thyroid desiccated (Nature-Throid 97.5 mg oral tablet)   Signed by Michael Ro MD  7/1/2019 7:58:00 AM        Belkis,    I am happy to put in a GI referral to follow up at [facility].    Yes, for Mini,  [facility] requires a visit within 30 days to do the RNC with nitrous. Unfortunately, her last well check up falls outside of those 30 days. So once you have her appointment set up for the RNC, come see one of us in [place] and we'll do a quick exam to make sure she is all ready to go.    I'll also send a Rx for the Nature Thyroid to see if insurance will cover it.    Thanks,  Fiordaliza Ro---------------------  From: [facility]   To: Phone Messages Pool ([address]224_[place]); Referral Coordinators Pool (32224_[place]); MAXIMILIAN SWIFT    Sent: 7/1/2019 7:59:26 AM CDT  Subject: RE: [facility]

## 2022-02-16 NOTE — TELEPHONE ENCOUNTER
---------------------  From: Michael Ro MD   To: MAXIMILIAN SWIFT    Sent: 9/9/2020 8:12:49 AM CDT  Subject: follow up     Nirmala Benítez let me know that you cancelled your appointment for today - hope it is because you are feeling better. If you aren't better, please reach out and let me know how we can help you.    Fiordaliza Ro

## 2022-02-16 NOTE — PROGRESS NOTES
Patient:   MAXIMILIAN SWIFT            MRN: [id number]            FIN: [id number]               Age:   39 years     Sex:  Female     :  1980   Associated Diagnoses:   Pain of right thumb; Overuse injury   Author:   Lemuel Concepcion PA-C      Report Summary   Diagnosis  Pain of right thumb (IPS32-GV M79.644).  Patient InstructionsOrders   Visit Information      Date of Service: 06/10/2019 10:16 am  Performing Location: [facility]  Encounter#: [id number]      Primary Care Provider (PCP):  Michael Ro MD, NPI# [id number]   Visit type:  General concerns.    Accompanied by:  No one.    Source of history:  Self.    Referral source:  Self.    History limitation:  None.       Chief Complaint   6/10/2019 10:18 AM CDT   pain in right thumb; clicking      History of Present Illness             The patient presents with finger pain.  The location of pain is the right.  The pain is described as aching, throbbing and with movement.  Pain at base of right thumb. No injury. Does a lot of repetitive motion. No past history of similar symptoms. CC above noted and confirmed with the patient..        Review of Systems   Constitutional:  Negative.    Musculoskeletal:  Negative except as documented in history of present illness.    Integumentary:  Negative.       Health Status   Allergies:    Allergic Reactions (All)  No Known Medication Allergies   Medications:  (Selected)   Documented Medications  Documented  Kariva oral tablet: 1 tab(s), Oral, daily, 0 Refill(s), Type: Maintenance  Nature-Throid 97.5 mg oral tablet: = 1 tab(s) ( 97.5 mg ), Oral, daily, 0 Refill(s), Type: Maintenance  NexIUM: ( 40 mg ), Oral, daily, 0 Refill(s), Type: Maintenance  Wellbutrin  mg/24 hours oral tablet, extended release: = 1 tab(s) ( 300 mg ), Oral, q 24 hrs, 0 Refill(s), Type: Maintenance  Zoloft 100 mg oral tablet: = 2 tab(s) ( 200 mg ), Oral, daily, 0 Refill(s), Type: Maintenance   Problem list:    All Problems (Selected)  Peptic  ulcer disease / SNOMED CT 76275954 / Confirmed  Moderate major depression / SNOMED CT 8519258 / Confirmed  Chronic back pain / SNOMED CT 143913668 / Confirmed      Histories   Past Medical History:    Active  Moderate major depression (2130163)  Peptic ulcer disease (37672090)  Chronic back pain (915509148)  Resolved  Disseminated intravascular coagulation (152592461):  Resolved.  Comments:  2019 CST 3:29 PM Michael Bonilla MD  following   Pregnancy (821974283):  Resolved on 2016 at 36 years.   Family History:    BCC - Basal cell carcinoma  Sister  Hypertension  Father  Liver damage  Grandfather (M)  CA - Cancer  Father  Comments:  2019 9:42 AM CST - Josefina Santos  Esophageal  Alcoholism  Father  Grandfather (M)  Depression  Father     Procedure history:     section (38934159) on 2016 at 36 Years.  Laparotomy (920184675) on 2016 at 36 Years.  Comments:  2019 3:28 PM Michael Bonilla MD  done for postpartum hemorrhage following       Physical Examination   Vital Signs   6/10/2019 10:18 AM CDT Peripheral Pulse Rate 60 bpm    HR Method Manual    Systolic Blood Pressure 108 mmHg    Diastolic Blood Pressure 60 mmHg    Mean Arterial Pressure 76 mmHg    BP Method Manual      Measurements from flowsheet : Measurements   6/10/2019 10:18 AM CDT Height Measured - Standard 65.5 in    Weight Measured - Standard 173.0 lb    BSA 1.9 m2    Body Mass Index 28.35 kg/m2  HI      Cardiovascular:          Arterial pulses: Right, Radial, 2+.         Capillary refill: Right, Upper extremity, Within normal limits.    Musculoskeletal:  Normal range of motion, No deformity, Pain at right first MCP. Mild swelling. Good ROM and strength..    Integumentary:  No rash.    Neurologic:  No focal deficits.       Review / Management   Radiology results   Appears normal to my read, waiting for official read.  Will contact patient with any other findings.      Impression and Plan    Diagnosis     Pain of right thumb (FGD80-KY M79.644).     Overuse injury (XTA86-NS S66.919A).     Patient Instructions:       Counseled: Patient, Regarding diagnosis, Activity, Verbalized understanding.    Orders     Orders (Selected)   Prescriptions  Prescribed  predniSONE 20 mg oral tablet: = 1 tab(s) ( 20 mg ), Oral, daily, x 7 day(s), # 7 tab(s), 0 Refill(s), Type: Acute, Pharmacy: [id number], 1 tab(s) Oral daily,x7 day(s).     Ice. Rest as able. RTC if not gradually improved, or if worsening.

## 2022-02-16 NOTE — LETTER
(Inserted Image. Unable to display)   October 26, 2020      MAXIMILIAN TELLEZYER   [address] [place], [place] [id number]        Dear MAXIMILIAN,      Thank you for selecting [facility] for your healthcare needs.     Our records indicate you are due for the following services:    Medication Check  Fasting Lab Tests ~ Please do not eat or drink anything 10 hours prior to your scheduled appointment time.                (Water and any medications that you may need are allowed unless directed otherwise.)    If you had your labs done at another facility or with [facility] at [facility], please bring in a copy of the results to your next visit, mail a copy, or drop off a copy of your results to your Healthcare Provider.    You are due for lab work and an office visit; please schedule the lab appointment 1 week before the office visit.  This will assure all results are available to discuss with your Healthcare Provider during your visit.    (FYI   Regarding office visits: In some instances, a video visit or telephone visit may be offered as an option.)    **It is very helpful if you bring your medication bottles to your appointment.  This assures we have all of your current medications, including strength and dosing information, documented accurately in your medical record.    To schedule an appointment or if you have further questions, please contact your clinic at [phone].       Powered by [employer]    Sincerely,    Michael Ro M.D.

## 2022-02-16 NOTE — TELEPHONE ENCOUNTER
---------------------  From: Hayder Cid CMA ([facility] Message Pool ([id number]_[facility]))   To: Appointment Pool ([id number]);     Sent: 6/17/2020 12:06:15 PM CDT  Subject: COVID Testing     please put pt on covid testing schedule today per CHRISTIANO.  Hayder Cid CMALV for patient to call and schedule---------------------  From: Stephensonb , April (Appointment Pool (81824_[facility]))   To: [employer] Message Pool (37424[facility]);     Sent: 6/22/2020 10:23:58 AM CDT  Subject: RE: COVID Testing     CALLED PT - STATES SHE IS FEELING BETTER/WAS JUST POSSIBLY HAVING AN OFF DAY - AT THIS TIME SHE WOULD LIKE TO DECLINE THE COVID TESTINGSENT MESSAGE TO PROVIDER - PT DOES NOT WANT TO SCHEDULE AT THIS TIMEHUNG Cid CMA---------------------  From: Hayder Cid CMA ([facility] Message Pool (17024[facility]))   To: Dylon Canseco PA-C;     Sent: 6/22/2020 10:27:35 AM CDT  Subject: FW: COVID Testing

## 2022-02-16 NOTE — TELEPHONE ENCOUNTER
---------------------  From: Beto/Dagmar CORONEL (Phone Messages Pool (30024_[place]))   To: [place] Message Pool (12924_[facility]);     Sent: 6/30/2020 8:10:45 AM CDT  Subject: FW: Dr. Ro: Can t stay asleep; Imitrex           ---------------------  From: BELKIS SWIFT  To: [facility]  Sent: 06/29/2020 10:04 p.m. CDT  Subject: Dr. Ro: Can t stay asleep; Imitrex  Hi Dr. Ro,  I ve been having trouble staying asleep. I can fall asleep but wake a lot at night.  Sometime I wake up and can t got back to sleep. I ve had trouble sleeping since Mini was born. Melatonin doesn t keep me asleep.  Is there anything that can help?  Some days I am so tired.  I just want to sleep but can t.  There isn t a tv in the bedroom, but reading puts me to sleep.  I read actual books, not an e-reader.    Can I get a prescription for Imitrex?  I got some a couple years ago and use it occasionally.  But as I sit here I feel another migraine coming on and will have to take my last one tonight.  That ll be two migraines in a week.    Thanks,  Belkis---------------------  From: Shantelle Young CMA ([place] Message Pool (32224_[facility]))   To: Michael Ro MD;     Sent: 6/30/2020 8:13:47 AM CDT  [facility] Dr. Ro: Can t stay asleep; Imitrex  ** Submitted: **  Order:SUMAtriptan (Imitrex 100 mg oral tablet)  1 tab(s)  Oral  once  Qty:  9 tab(s)        Refills:  5          Substitutions Allowed     PRN  for migraine headache      Route To Pharmacy - [facility]    Signed by Michael Ro MD  6/30/2020 1:18:00 PM [facility]        Belkis,   There are several options for prescription strength sleep meds - I would prefer we discuss this on a video visit so we can review all options and figure out the best fit. I have openings tomorrow and Thursday.  I will send in the imitrex. I wouldn't be surprised if the lack of sleep is contributing to your  migraines, but we can discuss those if you want as well.  Thanks,  Fiordaliza Ro---------------------  From: Michael Ro MD   To: TELOSL [employer] Pool (32224_[facility]);     Sent: 6/30/2020 8:18:19 AM CDT  Subject: RE: CONSUEMER MESSAGE : Dr. Ro: Can t stay asleep; Imitrexnoted

## 2022-02-16 NOTE — TELEPHONE ENCOUNTER
Entered by Marycarmen Richard CMA on February 24, 2020 1:15:26 PM CST  Returned Call  Time: 1:14 pm  Note:  Called & spoke verifying her current dose of sertraline. She is taking 200 mg daily. I refilled this until October and entered a RTC for med check & blood work. Asked her about her Wellbutrin and she states she changed her mind and is thinking she just had a bad day. Ok with same dose right now and does not need a refill. No further questions.      ---------------------  From: BELKIS SWIFT  To: [facility]  Sent: 02/23/2020 01:43 p.m. CST  Subject: Sertraline renewal  My sertraline isn t in [facility]. Thought I had more in the cabinet but I am out. Can I get that renewed please?  I m thinking I might need my Wellbutrin upped.    Belkis   Please sign order for tylenol #3

## 2022-02-16 NOTE — LETTER
(Inserted Image. Unable to display)   June 07, 2019      MAXIMILIAN SWIFT   [address] [place], [place] [id number]        Dear MAXIMILIAN,      Thank you for selecting [facility] (previously [place] [place] [facility]) for your healthcare needs.     Our records indicate you are due for the following services:     Annual Physical    To schedule an appointment or if you have further questions, please contact your primary clinic:   [facility]          [phone]   [facility]    [phone]             [facility]         [phone]      Powered by [employer]    Sincerely,    Michael Ro M.D.

## 2022-02-16 NOTE — TELEPHONE ENCOUNTER
Entered by Stefanie Dubose CMA on September 24, 2020 8:40:58 AM CDT  Hi Maximilian Ro is out of clinic until Monday. It looks like Dr. Jas Palacios had sent you a portal message regarding your TSH results. They remain in a normal range. I will forward this message for Dr. Ro to review when she is back in clinic.      Per Dr. Palacios: Thyroid levels are in normal range      Results:  Date Result Name Value   9/17/2020 4:39 PM TSH 1.56 mIU/L         Thanks!    Stefanie    ---------------------  From: MAXIMILIAN SWIFT  To: [employer] Pool ([id number]_[facility])  Sent: 09/24/2020 08:12 a.m. CDT  Subject: RE: FW: Dr. Ro  I see my TSH is trending up. Should my medication be adjusted?    Maximilian       Addendum by Maye Echols RN on September 21, 2020 1:38:38 PM CDT  ---------------------  From: Maye Echols RN ([employer] Message Pool ([id number]_[facility]))  To: MAXIMILIAN SWIFT  Sent: 9/21/2020 1:38:38 PM CDT  Subject: FW: Dr. Ro       Addendum by Michael Ro MD on September 21, 2020 11:49:31 AM CDT  ---------------------  From: Michael Ro MD  To: [employer] Message Pool ([id number]_[facility]); MAXIMILIAN SWIFT  Sent: 9/21/2020 11:49:31 AM CDT  Subject: RE: Dr. Ro       Addendum by Michael Ro MD on September 21, 2020 11:49:26 AM CDT  Maximilian,  I m happy to see you in clinic or if you are concerned that you need to be hospitalized at any point, an evaluation in the ER is reasonable. Let me know how things are going this week and we can figure out next steps.  Fiordaliza Ro       Addendum by Ava Lin on September 21, 2020 7:59:16 AM CDT  ---------------------  From: Ava Lin ([employer] Message Pool ([id number]_[facility]))  To: Jayjay BAÑULEOS [facility];  Sent: 9/21/2020 7:59:16 AM CDT  Subject: FW: Dr. Ro  ---------------------  From: Magdalena Monterroso LPN (Phone Messages Pool ([id number]_[facility]))  To: [employer] Message Pool ([id number]_[facility]); MAXIMILIAN SWIFT  Sent: 9/18/2020 8:04:03 AM CDT  Subject: FW:  Dr. Jayjay Tamayo,       Dr. Ro has been out of clinic this week. I can forward your message to her for Monday when she is back in clinic. I would recommend following up if you are still concerned to discuss further.       Thanks,  Magdalena SHETH LPN            ---------------------  From: BELKIS SWIFT  To: [facility]  Sent: 09/18/2020 07:58 a.m. CDT  Subject: Bp  My bp and heart rate were normal this morning 120/80. Pulse 56  A patient very similar to me was admitted at [facility] for further diagnostics.  Belkis                      ---------------------  From: BELKIS SWIFT  To: [facility]  Sent: 09/17/2020 09:51 p.m. CDT  Subject: Dr. Jayjay Goins Dr.,  I stopped into the clinic to get my bp checked and it was 167/80.  I was feeling light headed and my heart was pounding out of my chest.  I got pretty concerned about it while standing at the check-in desk that I mentioned it to the woman behind the desk.  I saw Dr. Palacios who said my vitals were within normal range.  They were not, not for me.  My resting hr is 60, 75 is elevated for me.  I didn t feel right.  The labs came back fine.  He suggested it is the sertraline and Wellbutrin that is causing my light headedness.  I don t believe this.  I ve been on them for quite awhile.  It doesn t explain the high blood pressure I ve had all week.  I m a little frustrated right now, and concerned.  Belkis---------------------  From: Stefanie Dubose CMA ([employer] Message Pool ([id number]_[facility]))   To: Jayjay BAÑUELOS [facility];     Sent: 9/24/2020 8:41:02 AM CDT  Subject: FW: FW: Dr. Goldstein other message about thyroid refill

## 2022-02-16 NOTE — TELEPHONE ENCOUNTER
---------------------  From: Jas Palacios MD   To: MAXIMILIAN SWIFT    Sent: 9/18/2020 10:06:02 AM CDT  Subject: General Message     Thyroid levels are in normal range      Results:  Date Result Name Value   9/17/2020 4:39 PM TSH 1.56 mIU/L

## 2022-02-16 NOTE — TELEPHONE ENCOUNTER
---------------------  From: Leonie Rivera CMA (Phone [employer] Pool ([id number]_[facility])   To: Michael Ro MD;     Sent: 10/22/2019 8:20:04 AM CDT  Subject: CONSUMER MESSAGE: Nexium renewal     Nexium documented in chart - please advise refill      ---------------------  From: MAXIMILIAN SWIFT  To: [facility]  Sent: 10/22/2019 07:40 a.m. CDT  Subject: Nexium renewal  Hello,  I need to have my Nexium 40mg renewed through [facility] please.  Jeff to refill x 1 year---------------------  From: Michael Ro MD   To: Phone Messages Pool ([id number]_[facility]);     Sent: 10/22/2019 1:36:07 PM CDT  Subject: RE: CONSUMER MESSAGE: Nexium renewalReturned Call  Time: 2:53 pm  Note:  Called & notified pt of the Rx refill x 1 year.

## 2022-02-16 NOTE — TELEPHONE ENCOUNTER
---------------------  From: Prisca Leslie MA (Phone [employer] Pool ([id number]_[facility]))   To: Michael Ro MD;     Sent: 7/22/2019 7:55:15 AM CDT  Subject: CONSUMER MESSAGE: Checking hemoglobin, iron           ---------------------  From: BELKIS SWIFT  To: [facility]  Sent: 07/21/2019 09:07 a.m. CDT  Subject: Checking hemoglobin, iron  Dr. Ro,    The colonoscopy was normal so now I m waiting to hear from [facility] about setting up an appointment for the [facility]. Should I be getting my blood levels checked through all of this?    Dylan,  Your hemoglobin was trending the right way, so I don't feel strongly that we need to check it unless you aren't feeling well or you notice blood or other changes to your stool that are suspicious. If you are concerned, I am happy to have you come in to have it checked again.  Thanks,  Fiordaliza Ro---------------------  From: Michael Ro MD   To: [employer] Pool ([id number]_[facility]); BELKIS SWIFT    Sent: 7/22/2019 8:07:39 AM CDT  Subject: RE: CONSUMER MESSAGE: Checking hemoglobin, iron---------------------  From: Prisca Leslie MA (Phone [employer] Pool ([id number]_[facility]))   To: Michael Ro MD;     Sent: 7/22/2019 8:37:42 AM CDT  Subject: RE: CONSUMER MESSAGE: Checking hemoglobin, iron     Patient states she is just tired. Thats why she is wondering about getting in checked.Belkis,  Yes, I think it is reasonable if you are having symptoms to come in for a lab visit for a hemoglobin to make sure it is headed the right way.  Thanks,  Fiordaliza Ro---------------------  From: Michael Ro MD   To: Phone Messages Pool ([id number]_[facility]);     Sent: 7/22/2019 10:02:28 AM CDT  Subject: RE: CONSUMER MESSAGE: Checking hemoglobin, iron---------------------  From: Prisca Leslie MA (Phone [employer] Pool ([address]224_[facility]))   To: BELKIS SWIFT    Sent: 7/22/2019 10:03:27 AM CDT  Subject: FW: CONSUMER MESSAGE: Checking  hemoglobin, iron

## 2022-02-16 NOTE — TELEPHONE ENCOUNTER
---------------------  From: Maye Echols LPN (Phone Messages Pool ([id number]_[facility]))   To: Referral [employer]ordinators Pool ([address] [place] [address]);     Sent: 8/12/2019 4:38:50 PM CDT  Subject: FW: CT Enterography Referall           ---------------------  From: MAXIMILIAN SWIFT  To: [facility]  Sent: 08/12/2019 04:32 p.m. CDT  Subject: CT Enterography Referall  I think I have part of it straightened out.    Can you send a referral to [place] for the CT Enterography?---------------------  From: Bonnie Sam (Referral Coordinators Pool ([facility]))   To: Michael Ro MD;     Sent: 8/13/2019 8:33:35 AM CDT  Subject: RE: CT Enterography Referall     Would you be able to put in an order?  ** Submitted: **  Order:CT Enterography (Request)  Details:  GI bleed         Signed by Michael Ro MD  8/13/2019 3:23:00 PM---------------------  From: Michael Ro MD   To: Referral Coordinators Pool ([id number]);     Sent: 8/13/2019 3:24:04 PM CDT  Subject: RE: CT Enterography Referall

## 2022-11-20 ENCOUNTER — HEALTH MAINTENANCE LETTER (OUTPATIENT)
Age: 42
End: 2022-11-20

## 2023-04-15 ENCOUNTER — HEALTH MAINTENANCE LETTER (OUTPATIENT)
Age: 43
End: 2023-04-15